# Patient Record
Sex: FEMALE | Race: WHITE | Employment: PART TIME | URBAN - METROPOLITAN AREA
[De-identification: names, ages, dates, MRNs, and addresses within clinical notes are randomized per-mention and may not be internally consistent; named-entity substitution may affect disease eponyms.]

---

## 2019-02-27 ENCOUNTER — TELEPHONE (OUTPATIENT)
Dept: GASTROENTEROLOGY | Facility: CLINIC | Age: 44
End: 2019-02-27

## 2019-02-27 NOTE — TELEPHONE ENCOUNTER
Please inform her that we can not just give an antibiotic until we know what is going on with her Thanks

## 2019-02-27 NOTE — TELEPHONE ENCOUNTER
Dr Audelia Simmonds - Patient called she was taking an antibiotic and by the end of medication patient was able to eat without complications  Is it possible for patient to be given a different antibiotic  Patient has OV 04/11/19  Please call [employer] [phone]   Any questions please call patient at [phone]

## 2019-02-27 NOTE — TELEPHONE ENCOUNTER
Spoke to pt and advised  Said she had a sinus infection but can eat better now and will keep appt in April

## 2019-04-11 ENCOUNTER — OFFICE VISIT (OUTPATIENT)
Dept: GASTROENTEROLOGY | Facility: CLINIC | Age: 44
End: 2019-04-11
Payer: COMMERCIAL

## 2019-04-11 ENCOUNTER — TELEPHONE (OUTPATIENT)
Dept: GASTROENTEROLOGY | Facility: CLINIC | Age: 44
End: 2019-04-11

## 2019-04-11 VITALS
SYSTOLIC BLOOD PRESSURE: 100 MMHG | HEART RATE: 84 BPM | WEIGHT: 176 LBS | DIASTOLIC BLOOD PRESSURE: 70 MMHG | HEIGHT: 62 IN | BODY MASS INDEX: 32.39 KG/M2

## 2019-04-11 DIAGNOSIS — K22.0 ACHALASIA: Primary | ICD-10-CM

## 2019-04-11 PROCEDURE — 99203 OFFICE O/P NEW LOW 30 MIN: CPT | Performed by: PHYSICIAN ASSISTANT

## 2019-04-11 RX ORDER — PANTOPRAZOLE SODIUM 40 MG/1
TABLET, DELAYED RELEASE ORAL
COMMUNITY
Start: 2018-10-02 | End: 2020-01-03 | Stop reason: ALTCHOICE

## 2019-04-15 ENCOUNTER — TELEPHONE (OUTPATIENT)
Dept: GASTROENTEROLOGY | Facility: CLINIC | Age: 44
End: 2019-04-15

## 2019-04-19 ENCOUNTER — OFFICE VISIT (OUTPATIENT)
Dept: CARDIAC SURGERY | Facility: CLINIC | Age: 44
End: 2019-04-19
Payer: COMMERCIAL

## 2019-04-19 ENCOUNTER — TELEPHONE (OUTPATIENT)
Dept: CARDIAC SURGERY | Facility: CLINIC | Age: 44
End: 2019-04-19

## 2019-04-19 ENCOUNTER — PATIENT MESSAGE (OUTPATIENT)
Dept: CARDIAC SURGERY | Facility: CLINIC | Age: 44
End: 2019-04-19

## 2019-04-19 VITALS
BODY MASS INDEX: 31.76 KG/M2 | HEART RATE: 78 BPM | SYSTOLIC BLOOD PRESSURE: 130 MMHG | HEIGHT: 62 IN | OXYGEN SATURATION: 98 % | TEMPERATURE: 98.6 F | DIASTOLIC BLOOD PRESSURE: 78 MMHG | WEIGHT: 172.6 LBS

## 2019-04-19 DIAGNOSIS — K22.0 ACHALASIA: Primary | ICD-10-CM

## 2019-04-19 PROCEDURE — 99245 OFF/OP CONSLTJ NEW/EST HI 55: CPT | Performed by: THORACIC SURGERY (CARDIOTHORACIC VASCULAR SURGERY)

## 2019-04-26 ENCOUNTER — HOSPITAL ENCOUNTER (OUTPATIENT)
Dept: RADIOLOGY | Facility: HOSPITAL | Age: 44
Discharge: HOME | End: 2019-04-26
Attending: THORACIC SURGERY (CARDIOTHORACIC VASCULAR SURGERY)
Payer: COMMERCIAL

## 2019-04-26 DIAGNOSIS — K22.0 ACHALASIA: Primary | ICD-10-CM

## 2019-04-26 DIAGNOSIS — K22.0 ACHALASIA: ICD-10-CM

## 2019-04-26 PROCEDURE — 74240 X-RAY XM UPR GI TRC 1CNTRST: CPT

## 2019-05-02 ENCOUNTER — HOSPITAL ENCOUNTER (OUTPATIENT)
Dept: CT IMAGING | Facility: HOSPITAL | Age: 44
Discharge: HOME | End: 2019-05-02
Attending: THORACIC SURGERY (CARDIOTHORACIC VASCULAR SURGERY)
Payer: COMMERCIAL

## 2019-05-02 DIAGNOSIS — K22.0 ACHALASIA: ICD-10-CM

## 2019-05-02 PROCEDURE — 71250 CT THORAX DX C-: CPT

## 2019-05-03 ENCOUNTER — HOSPITAL ENCOUNTER (OUTPATIENT)
Dept: GASTROENTEROLOGY | Facility: HOSPITAL | Age: 44
Discharge: HOME | End: 2019-05-03
Attending: THORACIC SURGERY (CARDIOTHORACIC VASCULAR SURGERY)
Payer: COMMERCIAL

## 2019-05-03 VITALS
HEART RATE: 58 BPM | DIASTOLIC BLOOD PRESSURE: 72 MMHG | OXYGEN SATURATION: 100 % | SYSTOLIC BLOOD PRESSURE: 118 MMHG | TEMPERATURE: 98.7 F | RESPIRATION RATE: 20 BRPM

## 2019-05-03 PROCEDURE — 91020 GASTRIC MOTILITY STUDIES: CPT

## 2019-05-03 PROCEDURE — 91010 ESOPHAGUS MOTILITY STUDY: CPT | Performed by: INTERNAL MEDICINE

## 2019-05-09 ENCOUNTER — OFFICE VISIT (OUTPATIENT)
Dept: CARDIAC SURGERY | Facility: CLINIC | Age: 44
End: 2019-05-09
Payer: COMMERCIAL

## 2019-05-09 VITALS
HEART RATE: 67 BPM | BODY MASS INDEX: 31.83 KG/M2 | OXYGEN SATURATION: 96 % | HEIGHT: 62 IN | SYSTOLIC BLOOD PRESSURE: 127 MMHG | TEMPERATURE: 98.7 F | DIASTOLIC BLOOD PRESSURE: 65 MMHG | WEIGHT: 173 LBS

## 2019-05-09 DIAGNOSIS — K22.0 ACHALASIA: Primary | ICD-10-CM

## 2019-05-09 PROCEDURE — 99213 OFFICE O/P EST LOW 20 MIN: CPT | Performed by: THORACIC SURGERY (CARDIOTHORACIC VASCULAR SURGERY)

## 2019-05-09 RX ORDER — CEFAZOLIN SODIUM 2 G/50ML
2000 SOLUTION INTRAVENOUS ONCE
Status: CANCELLED | OUTPATIENT
Start: 2019-05-09 | End: 2019-05-09

## 2019-05-10 ENCOUNTER — APPOINTMENT (OUTPATIENT)
Dept: LAB | Facility: HOSPITAL | Age: 44
End: 2019-05-10
Payer: COMMERCIAL

## 2019-05-10 ENCOUNTER — TRANSCRIBE ORDERS (OUTPATIENT)
Dept: ADMINISTRATIVE | Facility: HOSPITAL | Age: 44
End: 2019-05-10

## 2019-05-10 DIAGNOSIS — K22.0 ACHALASIA: Primary | ICD-10-CM

## 2019-05-10 DIAGNOSIS — K22.0 ACHALASIA: ICD-10-CM

## 2019-05-10 LAB
ANION GAP SERPL CALCULATED.3IONS-SCNC: 6 MMOL/L (ref 4–13)
APTT PPP: 36 SECONDS (ref 26–38)
BUN SERPL-MCNC: 6 MG/DL (ref 5–25)
CALCIUM SERPL-MCNC: 8.8 MG/DL (ref 8.3–10.1)
CHLORIDE SERPL-SCNC: 103 MMOL/L (ref 100–108)
CO2 SERPL-SCNC: 29 MMOL/L (ref 21–32)
CREAT SERPL-MCNC: 0.67 MG/DL (ref 0.6–1.3)
ERYTHROCYTE [DISTWIDTH] IN BLOOD BY AUTOMATED COUNT: 12.1 % (ref 11.6–15.1)
GFR SERPL CREATININE-BSD FRML MDRD: 107 ML/MIN/1.73SQ M
GLUCOSE SERPL-MCNC: 90 MG/DL (ref 65–140)
HCT VFR BLD AUTO: 40.3 % (ref 34.8–46.1)
HGB BLD-MCNC: 13.2 G/DL (ref 11.5–15.4)
INR PPP: 1.16 (ref 0.86–1.17)
MCH RBC QN AUTO: 32 PG (ref 26.8–34.3)
MCHC RBC AUTO-ENTMCNC: 32.8 G/DL (ref 31.4–37.4)
MCV RBC AUTO: 98 FL (ref 82–98)
PLATELET # BLD AUTO: 288 THOUSANDS/UL (ref 149–390)
PMV BLD AUTO: 10.7 FL (ref 8.9–12.7)
POTASSIUM SERPL-SCNC: 3.8 MMOL/L (ref 3.5–5.3)
PROTHROMBIN TIME: 14.7 SECONDS (ref 11.8–14.2)
RBC # BLD AUTO: 4.12 MILLION/UL (ref 3.81–5.12)
SODIUM SERPL-SCNC: 138 MMOL/L (ref 136–145)
WBC # BLD AUTO: 7.37 THOUSAND/UL (ref 4.31–10.16)

## 2019-05-10 PROCEDURE — 85610 PROTHROMBIN TIME: CPT

## 2019-05-10 PROCEDURE — 85730 THROMBOPLASTIN TIME PARTIAL: CPT

## 2019-05-10 PROCEDURE — 85027 COMPLETE CBC AUTOMATED: CPT

## 2019-05-10 PROCEDURE — 80048 BASIC METABOLIC PNL TOTAL CA: CPT

## 2019-05-10 PROCEDURE — 36415 COLL VENOUS BLD VENIPUNCTURE: CPT

## 2019-05-10 PROCEDURE — 86900 BLOOD TYPING SEROLOGIC ABO: CPT | Performed by: PHYSICIAN ASSISTANT

## 2019-05-10 PROCEDURE — 86850 RBC ANTIBODY SCREEN: CPT | Performed by: PHYSICIAN ASSISTANT

## 2019-05-10 PROCEDURE — 86901 BLOOD TYPING SEROLOGIC RH(D): CPT | Performed by: PHYSICIAN ASSISTANT

## 2019-05-11 ENCOUNTER — LAB REQUISITION (OUTPATIENT)
Dept: LAB | Facility: HOSPITAL | Age: 44
End: 2019-05-11
Payer: COMMERCIAL

## 2019-05-11 DIAGNOSIS — K22.0 ACHALASIA OF CARDIA: ICD-10-CM

## 2019-05-11 LAB
ABO GROUP BLD: NORMAL
BLD GP AB SCN SERPL QL: NEGATIVE
RH BLD: POSITIVE
SPECIMEN EXPIRATION DATE: NORMAL

## 2019-05-21 ENCOUNTER — ANESTHESIA EVENT (OUTPATIENT)
Dept: PERIOP | Facility: HOSPITAL | Age: 44
DRG: 220 | End: 2019-05-21
Payer: COMMERCIAL

## 2019-05-22 ENCOUNTER — HOSPITAL ENCOUNTER (INPATIENT)
Facility: HOSPITAL | Age: 44
LOS: 2 days | Discharge: HOME | DRG: 220 | End: 2019-05-24
Attending: THORACIC SURGERY (CARDIOTHORACIC VASCULAR SURGERY) | Admitting: THORACIC SURGERY (CARDIOTHORACIC VASCULAR SURGERY)
Payer: COMMERCIAL

## 2019-05-22 ENCOUNTER — ANESTHESIA (OUTPATIENT)
Dept: PERIOP | Facility: HOSPITAL | Age: 44
DRG: 220 | End: 2019-05-22
Payer: COMMERCIAL

## 2019-05-22 ENCOUNTER — APPOINTMENT (INPATIENT)
Dept: RADIOLOGY | Facility: HOSPITAL | Age: 44
DRG: 220 | End: 2019-05-22
Payer: COMMERCIAL

## 2019-05-22 DIAGNOSIS — K22.0 ACHALASIA: Primary | ICD-10-CM

## 2019-05-22 LAB
ANION GAP SERPL CALCULATED.3IONS-SCNC: 12 MMOL/L (ref 4–13)
BUN SERPL-MCNC: 12 MG/DL (ref 5–25)
CALCIUM SERPL-MCNC: 7.6 MG/DL (ref 8.3–10.1)
CHLORIDE SERPL-SCNC: 110 MMOL/L (ref 100–108)
CO2 SERPL-SCNC: 17 MMOL/L (ref 21–32)
CREAT SERPL-MCNC: 0.74 MG/DL (ref 0.6–1.3)
ERYTHROCYTE [DISTWIDTH] IN BLOOD BY AUTOMATED COUNT: 12.1 % (ref 11.6–15.1)
GFR SERPL CREATININE-BSD FRML MDRD: 99 ML/MIN/1.73SQ M
GLUCOSE SERPL-MCNC: 92 MG/DL (ref 65–140)
HCT VFR BLD AUTO: 38 % (ref 34.8–46.1)
HGB BLD-MCNC: 12 G/DL (ref 11.5–15.4)
MAGNESIUM SERPL-MCNC: 1.9 MG/DL (ref 1.6–2.6)
MCH RBC QN AUTO: 31.7 PG (ref 26.8–34.3)
MCHC RBC AUTO-ENTMCNC: 31.6 G/DL (ref 31.4–37.4)
MCV RBC AUTO: 101 FL (ref 82–98)
PLATELET # BLD AUTO: 200 THOUSANDS/UL (ref 149–390)
PLATELET # BLD AUTO: 204 THOUSANDS/UL (ref 149–390)
PMV BLD AUTO: 10.9 FL (ref 8.9–12.7)
PMV BLD AUTO: 11.5 FL (ref 8.9–12.7)
POTASSIUM SERPL-SCNC: 3.9 MMOL/L (ref 3.5–5.3)
RBC # BLD AUTO: 3.78 MILLION/UL (ref 3.81–5.12)
SODIUM SERPL-SCNC: 139 MMOL/L (ref 136–145)
WBC # BLD AUTO: 13.16 THOUSAND/UL (ref 4.31–10.16)

## 2019-05-22 PROCEDURE — 80048 BASIC METABOLIC PNL TOTAL CA: CPT | Performed by: PHYSICIAN ASSISTANT

## 2019-05-22 PROCEDURE — 83735 ASSAY OF MAGNESIUM: CPT | Performed by: PHYSICIAN ASSISTANT

## 2019-05-22 PROCEDURE — 71045 X-RAY EXAM CHEST 1 VIEW: CPT

## 2019-05-22 PROCEDURE — 0D844ZZ DIVISION OF ESOPHAGOGASTRIC JUNCTION, PERCUTANEOUS ENDOSCOPIC APPROACH: ICD-10-PCS | Performed by: THORACIC SURGERY (CARDIOTHORACIC VASCULAR SURGERY)

## 2019-05-22 PROCEDURE — 0DV44ZZ RESTRICTION OF ESOPHAGOGASTRIC JUNCTION, PERCUTANEOUS ENDOSCOPIC APPROACH: ICD-10-PCS | Performed by: THORACIC SURGERY (CARDIOTHORACIC VASCULAR SURGERY)

## 2019-05-22 PROCEDURE — 85027 COMPLETE CBC AUTOMATED: CPT | Performed by: PHYSICIAN ASSISTANT

## 2019-05-22 PROCEDURE — 43279 LAP MYOTOMY HELLER: CPT | Performed by: THORACIC SURGERY (CARDIOTHORACIC VASCULAR SURGERY)

## 2019-05-22 PROCEDURE — 85049 AUTOMATED PLATELET COUNT: CPT | Performed by: PHYSICIAN ASSISTANT

## 2019-05-22 RX ORDER — GLYCOPYRROLATE 0.2 MG/ML
INJECTION INTRAMUSCULAR; INTRAVENOUS AS NEEDED
Status: DISCONTINUED | OUTPATIENT
Start: 2019-05-22 | End: 2019-05-22 | Stop reason: SURG

## 2019-05-22 RX ORDER — KETAMINE HYDROCHLORIDE 50 MG/ML
INJECTION, SOLUTION, CONCENTRATE INTRAMUSCULAR; INTRAVENOUS AS NEEDED
Status: DISCONTINUED | OUTPATIENT
Start: 2019-05-22 | End: 2019-05-22 | Stop reason: SURG

## 2019-05-22 RX ORDER — ACETAMINOPHEN 325 MG/1
650 TABLET ORAL EVERY 6 HOURS PRN
COMMUNITY
End: 2019-05-24 | Stop reason: HOSPADM

## 2019-05-22 RX ORDER — ONDANSETRON 2 MG/ML
4 INJECTION INTRAMUSCULAR; INTRAVENOUS ONCE AS NEEDED
Status: DISCONTINUED | OUTPATIENT
Start: 2019-05-22 | End: 2019-05-22 | Stop reason: HOSPADM

## 2019-05-22 RX ORDER — ROCURONIUM BROMIDE 10 MG/ML
INJECTION, SOLUTION INTRAVENOUS AS NEEDED
Status: DISCONTINUED | OUTPATIENT
Start: 2019-05-22 | End: 2019-05-22 | Stop reason: SURG

## 2019-05-22 RX ORDER — HYDROMORPHONE HCL/PF 1 MG/ML
0.5 SYRINGE (ML) INJECTION
Status: DISCONTINUED | OUTPATIENT
Start: 2019-05-22 | End: 2019-05-22

## 2019-05-22 RX ORDER — LIDOCAINE HYDROCHLORIDE AND EPINEPHRINE 10; 10 MG/ML; UG/ML
INJECTION, SOLUTION INFILTRATION; PERINEURAL AS NEEDED
Status: DISCONTINUED | OUTPATIENT
Start: 2019-05-22 | End: 2019-05-22 | Stop reason: HOSPADM

## 2019-05-22 RX ORDER — ALBUMIN, HUMAN INJ 5% 5 %
SOLUTION INTRAVENOUS CONTINUOUS PRN
Status: DISCONTINUED | OUTPATIENT
Start: 2019-05-22 | End: 2019-05-22 | Stop reason: SURG

## 2019-05-22 RX ORDER — FENTANYL CITRATE/PF 50 MCG/ML
25 SYRINGE (ML) INJECTION
Status: COMPLETED | OUTPATIENT
Start: 2019-05-22 | End: 2019-05-22

## 2019-05-22 RX ORDER — MAGNESIUM HYDROXIDE 1200 MG/15ML
LIQUID ORAL AS NEEDED
Status: DISCONTINUED | OUTPATIENT
Start: 2019-05-22 | End: 2019-05-22 | Stop reason: HOSPADM

## 2019-05-22 RX ORDER — HYDROMORPHONE HCL/PF 1 MG/ML
0.5 SYRINGE (ML) INJECTION
Status: DISCONTINUED | OUTPATIENT
Start: 2019-05-22 | End: 2019-05-24 | Stop reason: HOSPADM

## 2019-05-22 RX ORDER — MIDAZOLAM HYDROCHLORIDE 1 MG/ML
INJECTION INTRAMUSCULAR; INTRAVENOUS AS NEEDED
Status: DISCONTINUED | OUTPATIENT
Start: 2019-05-22 | End: 2019-05-22 | Stop reason: SURG

## 2019-05-22 RX ORDER — OXYCODONE HYDROCHLORIDE 10 MG/1
10 TABLET ORAL EVERY 4 HOURS PRN
Status: DISCONTINUED | OUTPATIENT
Start: 2019-05-22 | End: 2019-05-22

## 2019-05-22 RX ORDER — DEXAMETHASONE SODIUM PHOSPHATE 10 MG/ML
INJECTION, SOLUTION INTRAMUSCULAR; INTRAVENOUS AS NEEDED
Status: DISCONTINUED | OUTPATIENT
Start: 2019-05-22 | End: 2019-05-22 | Stop reason: SURG

## 2019-05-22 RX ORDER — HYDROMORPHONE HCL/PF 1 MG/ML
0.5 SYRINGE (ML) INJECTION
Status: DISCONTINUED | OUTPATIENT
Start: 2019-05-22 | End: 2019-05-22 | Stop reason: HOSPADM

## 2019-05-22 RX ORDER — HYDROMORPHONE HYDROCHLORIDE 2 MG/ML
INJECTION, SOLUTION INTRAMUSCULAR; INTRAVENOUS; SUBCUTANEOUS AS NEEDED
Status: DISCONTINUED | OUTPATIENT
Start: 2019-05-22 | End: 2019-05-22 | Stop reason: SURG

## 2019-05-22 RX ORDER — OXYCODONE HYDROCHLORIDE 5 MG/1
5 TABLET ORAL EVERY 4 HOURS PRN
Status: DISCONTINUED | OUTPATIENT
Start: 2019-05-22 | End: 2019-05-22

## 2019-05-22 RX ORDER — SODIUM CHLORIDE, SODIUM LACTATE, POTASSIUM CHLORIDE, CALCIUM CHLORIDE 600; 310; 30; 20 MG/100ML; MG/100ML; MG/100ML; MG/100ML
125 INJECTION, SOLUTION INTRAVENOUS CONTINUOUS
Status: DISCONTINUED | OUTPATIENT
Start: 2019-05-22 | End: 2019-05-22

## 2019-05-22 RX ORDER — FENTANYL CITRATE 50 UG/ML
INJECTION, SOLUTION INTRAMUSCULAR; INTRAVENOUS AS NEEDED
Status: DISCONTINUED | OUTPATIENT
Start: 2019-05-22 | End: 2019-05-22 | Stop reason: SURG

## 2019-05-22 RX ORDER — LIDOCAINE HYDROCHLORIDE 10 MG/ML
INJECTION, SOLUTION INFILTRATION; PERINEURAL AS NEEDED
Status: DISCONTINUED | OUTPATIENT
Start: 2019-05-22 | End: 2019-05-22 | Stop reason: SURG

## 2019-05-22 RX ORDER — SUCCINYLCHOLINE/SOD CL,ISO/PF 100 MG/5ML
SYRINGE (ML) INTRAVENOUS AS NEEDED
Status: DISCONTINUED | OUTPATIENT
Start: 2019-05-22 | End: 2019-05-22 | Stop reason: SURG

## 2019-05-22 RX ORDER — SODIUM CHLORIDE, SODIUM LACTATE, POTASSIUM CHLORIDE, CALCIUM CHLORIDE 600; 310; 30; 20 MG/100ML; MG/100ML; MG/100ML; MG/100ML
60 INJECTION, SOLUTION INTRAVENOUS CONTINUOUS
Status: DISCONTINUED | OUTPATIENT
Start: 2019-05-22 | End: 2019-05-23

## 2019-05-22 RX ORDER — LIDOCAINE HYDROCHLORIDE 10 MG/ML
0.5 INJECTION, SOLUTION EPIDURAL; INFILTRATION; INTRACAUDAL; PERINEURAL ONCE AS NEEDED
Status: COMPLETED | OUTPATIENT
Start: 2019-05-22 | End: 2019-05-22

## 2019-05-22 RX ORDER — ONDANSETRON 2 MG/ML
4 INJECTION INTRAMUSCULAR; INTRAVENOUS EVERY 4 HOURS PRN
Status: DISCONTINUED | OUTPATIENT
Start: 2019-05-22 | End: 2019-05-24 | Stop reason: HOSPADM

## 2019-05-22 RX ORDER — SODIUM CHLORIDE, SODIUM LACTATE, POTASSIUM CHLORIDE, CALCIUM CHLORIDE 600; 310; 30; 20 MG/100ML; MG/100ML; MG/100ML; MG/100ML
100 INJECTION, SOLUTION INTRAVENOUS CONTINUOUS
Status: DISCONTINUED | OUTPATIENT
Start: 2019-05-22 | End: 2019-05-23

## 2019-05-22 RX ORDER — ESMOLOL HYDROCHLORIDE 10 MG/ML
INJECTION INTRAVENOUS AS NEEDED
Status: DISCONTINUED | OUTPATIENT
Start: 2019-05-22 | End: 2019-05-22 | Stop reason: SURG

## 2019-05-22 RX ORDER — ONDANSETRON 2 MG/ML
4 INJECTION INTRAMUSCULAR; INTRAVENOUS EVERY 6 HOURS PRN
Status: DISCONTINUED | OUTPATIENT
Start: 2019-05-22 | End: 2019-05-22

## 2019-05-22 RX ORDER — ONDANSETRON 2 MG/ML
INJECTION INTRAMUSCULAR; INTRAVENOUS AS NEEDED
Status: DISCONTINUED | OUTPATIENT
Start: 2019-05-22 | End: 2019-05-22 | Stop reason: SURG

## 2019-05-22 RX ORDER — NEOSTIGMINE METHYLSULFATE 1 MG/ML
INJECTION INTRAVENOUS AS NEEDED
Status: DISCONTINUED | OUTPATIENT
Start: 2019-05-22 | End: 2019-05-22 | Stop reason: SURG

## 2019-05-22 RX ORDER — CEFAZOLIN SODIUM 2 G/50ML
2000 SOLUTION INTRAVENOUS ONCE
Status: COMPLETED | OUTPATIENT
Start: 2019-05-22 | End: 2019-05-22

## 2019-05-22 RX ORDER — SODIUM CHLORIDE 9 MG/ML
INJECTION, SOLUTION INTRAVENOUS CONTINUOUS PRN
Status: DISCONTINUED | OUTPATIENT
Start: 2019-05-22 | End: 2019-05-22 | Stop reason: SURG

## 2019-05-22 RX ORDER — ACETAMINOPHEN 325 MG/1
975 TABLET ORAL EVERY 6 HOURS PRN
Status: DISCONTINUED | OUTPATIENT
Start: 2019-05-22 | End: 2019-05-22

## 2019-05-22 RX ORDER — HYDROMORPHONE HCL/PF 1 MG/ML
0.2 SYRINGE (ML) INJECTION
Status: DISCONTINUED | OUTPATIENT
Start: 2019-05-22 | End: 2019-05-23

## 2019-05-22 RX ORDER — HYDROMORPHONE HCL/PF 1 MG/ML
1 SYRINGE (ML) INJECTION
Status: DISCONTINUED | OUTPATIENT
Start: 2019-05-22 | End: 2019-05-24 | Stop reason: HOSPADM

## 2019-05-22 RX ADMIN — GLYCOPYRROLATE 0.1 MG: 0.2 INJECTION, SOLUTION INTRAMUSCULAR; INTRAVENOUS at 13:10

## 2019-05-22 RX ADMIN — FENTANYL CITRATE 50 MCG: 50 INJECTION, SOLUTION INTRAMUSCULAR; INTRAVENOUS at 13:51

## 2019-05-22 RX ADMIN — SODIUM CHLORIDE, SODIUM LACTATE, POTASSIUM CHLORIDE, AND CALCIUM CHLORIDE: .6; .31; .03; .02 INJECTION, SOLUTION INTRAVENOUS at 13:04

## 2019-05-22 RX ADMIN — FENTANYL CITRATE 50 MCG: 50 INJECTION, SOLUTION INTRAMUSCULAR; INTRAVENOUS at 14:17

## 2019-05-22 RX ADMIN — KETAMINE HYDROCHLORIDE 50 MG: 50 INJECTION, SOLUTION INTRAMUSCULAR; INTRAVENOUS at 13:10

## 2019-05-22 RX ADMIN — ALBUMIN (HUMAN): 12.5 SOLUTION INTRAVENOUS at 14:18

## 2019-05-22 RX ADMIN — GLYCOPYRROLATE 0.1 MG: 0.2 INJECTION, SOLUTION INTRAMUSCULAR; INTRAVENOUS at 13:57

## 2019-05-22 RX ADMIN — NEOSTIGMINE METHYLSULFATE 3 MG: 1 INJECTION, SOLUTION INTRAVENOUS at 16:48

## 2019-05-22 RX ADMIN — ONDANSETRON 4 MG: 2 INJECTION INTRAMUSCULAR; INTRAVENOUS at 16:46

## 2019-05-22 RX ADMIN — FENTANYL CITRATE 50 MCG: 50 INJECTION, SOLUTION INTRAMUSCULAR; INTRAVENOUS at 13:10

## 2019-05-22 RX ADMIN — CEFAZOLIN SODIUM 2000 MG: 2 SOLUTION INTRAVENOUS at 13:26

## 2019-05-22 RX ADMIN — FENTANYL CITRATE 25 MCG: 50 INJECTION, SOLUTION INTRAMUSCULAR; INTRAVENOUS at 17:25

## 2019-05-22 RX ADMIN — FENTANYL CITRATE 25 MCG: 50 INJECTION, SOLUTION INTRAMUSCULAR; INTRAVENOUS at 17:45

## 2019-05-22 RX ADMIN — HYDROMORPHONE HYDROCHLORIDE 0.5 MG: 1 INJECTION, SOLUTION INTRAMUSCULAR; INTRAVENOUS; SUBCUTANEOUS at 21:51

## 2019-05-22 RX ADMIN — MIDAZOLAM 2 MG: 1 INJECTION INTRAMUSCULAR; INTRAVENOUS at 13:02

## 2019-05-22 RX ADMIN — ROCURONIUM BROMIDE 10 MG: 10 INJECTION, SOLUTION INTRAVENOUS at 13:51

## 2019-05-22 RX ADMIN — SODIUM CHLORIDE, SODIUM LACTATE, POTASSIUM CHLORIDE, AND CALCIUM CHLORIDE 125 ML/HR: .6; .31; .03; .02 INJECTION, SOLUTION INTRAVENOUS at 11:55

## 2019-05-22 RX ADMIN — ESMOLOL HYDROCHLORIDE 10 MG: 100 INJECTION, SOLUTION INTRAVENOUS at 16:16

## 2019-05-22 RX ADMIN — SODIUM CHLORIDE, SODIUM LACTATE, POTASSIUM CHLORIDE, AND CALCIUM CHLORIDE 60 ML/HR: .6; .31; .03; .02 INJECTION, SOLUTION INTRAVENOUS at 19:30

## 2019-05-22 RX ADMIN — ALBUMIN (HUMAN): 12.5 SOLUTION INTRAVENOUS at 13:16

## 2019-05-22 RX ADMIN — ALBUMIN (HUMAN): 12.5 SOLUTION INTRAVENOUS at 16:25

## 2019-05-22 RX ADMIN — FENTANYL CITRATE 25 MCG: 50 INJECTION, SOLUTION INTRAMUSCULAR; INTRAVENOUS at 17:35

## 2019-05-22 RX ADMIN — METRONIDAZOLE 500 MG: 500 INJECTION, SOLUTION INTRAVENOUS at 13:20

## 2019-05-22 RX ADMIN — ROCURONIUM BROMIDE 40 MG: 10 INJECTION, SOLUTION INTRAVENOUS at 13:24

## 2019-05-22 RX ADMIN — SODIUM CHLORIDE: 0.9 INJECTION, SOLUTION INTRAVENOUS at 13:15

## 2019-05-22 RX ADMIN — GLYCOPYRROLATE 0.6 MG: 0.2 INJECTION, SOLUTION INTRAMUSCULAR; INTRAVENOUS at 16:48

## 2019-05-22 RX ADMIN — PHENYLEPHRINE HYDROCHLORIDE 20 MCG/MIN: 10 INJECTION INTRAVENOUS at 13:17

## 2019-05-22 RX ADMIN — HYDROMORPHONE HYDROCHLORIDE 0.5 MG: 2 INJECTION, SOLUTION INTRAMUSCULAR; INTRAVENOUS; SUBCUTANEOUS at 13:29

## 2019-05-22 RX ADMIN — FENTANYL CITRATE 50 MCG: 50 INJECTION, SOLUTION INTRAMUSCULAR; INTRAVENOUS at 16:01

## 2019-05-22 RX ADMIN — LIDOCAINE HYDROCHLORIDE 0.5 ML: 10 INJECTION, SOLUTION EPIDURAL; INFILTRATION; INTRACAUDAL; PERINEURAL at 11:55

## 2019-05-22 RX ADMIN — FENTANYL CITRATE 25 MCG: 50 INJECTION, SOLUTION INTRAMUSCULAR; INTRAVENOUS at 17:55

## 2019-05-22 RX ADMIN — LIDOCAINE HYDROCHLORIDE 100 MG: 10 INJECTION, SOLUTION INFILTRATION; PERINEURAL at 13:10

## 2019-05-22 RX ADMIN — Medication 100 MG: at 13:10

## 2019-05-22 RX ADMIN — DEXAMETHASONE SODIUM PHOSPHATE 10 MG: 10 INJECTION, SOLUTION INTRAMUSCULAR; INTRAVENOUS at 13:10

## 2019-05-22 NOTE — ANESTHESIA POSTPROCEDURE EVALUATION
Post-Op Assessment Note    CV Status:  Stable  Pain Score: 0    Pain management: adequate     Mental Status:  Alert and awake   Hydration Status:  Euvolemic   PONV Controlled:  Controlled   Airway Patency:  Patent   Post Op Vitals Reviewed: Yes      Staff: CRNA   Comments: patient TXd to PACU O2 via NC 2L, SV, no events, report give to PACU RN @ bedside, VSS no bfurther interventions @THIS MTIME          BP   115/72   Temp   98 5   Pulse  106   Resp   18   SpO2   100 2L NC

## 2019-05-22 NOTE — ANESTHESIA PREPROCEDURE EVALUATION
Review of Systems/Medical History  Patient summary reviewed  Chart reviewed      Cardiovascular  Negative cardio ROS Exercise tolerance (METS): >4,     Pulmonary  Smoker ex-smoker  ,        GI/Hepatic    GERD well controlled,        Negative  ROS        Endo/Other  Negative endo/other ROS      GYN  Negative gynecology ROS          Hematology  Negative hematology ROS      Musculoskeletal  Negative musculoskeletal ROS        Neurology  Negative neurology ROS      Psychology   Negative psychology ROS              Physical Exam    Airway    Mallampati score: II  TM Distance: >3 FB  Neck ROM: full     Dental   upper dentures and lower dentures,     Cardiovascular  Comment: Negative ROS, Cardiovascular exam normal    Pulmonary  Pulmonary exam normal     Other Findings        Anesthesia Plan  ASA Score- 2     Anesthesia Type- general with ASA Monitors  Additional Monitors:   Airway Plan: ETT  Plan Factors-  Patient did not smoke on day of surgery  Induction- intravenous and rapid sequence induction  Postoperative Plan-     Informed Consent- Anesthetic plan and risks discussed with patient  I personally reviewed this patient with the CRNA  Discussed and agreed on the Anesthesia Plan with the CRNA  Ivonne De Santiago

## 2019-05-23 ENCOUNTER — APPOINTMENT (INPATIENT)
Dept: RADIOLOGY | Facility: HOSPITAL | Age: 44
DRG: 220 | End: 2019-05-23
Payer: COMMERCIAL

## 2019-05-23 LAB
ANION GAP SERPL CALCULATED.3IONS-SCNC: 13 MMOL/L (ref 4–13)
BUN SERPL-MCNC: 9 MG/DL (ref 5–25)
CALCIUM SERPL-MCNC: 8.1 MG/DL (ref 8.3–10.1)
CHLORIDE SERPL-SCNC: 113 MMOL/L (ref 100–108)
CO2 SERPL-SCNC: 16 MMOL/L (ref 21–32)
CREAT SERPL-MCNC: 0.53 MG/DL (ref 0.6–1.3)
ERYTHROCYTE [DISTWIDTH] IN BLOOD BY AUTOMATED COUNT: 12.1 % (ref 11.6–15.1)
GFR SERPL CREATININE-BSD FRML MDRD: 116 ML/MIN/1.73SQ M
GLUCOSE SERPL-MCNC: 71 MG/DL (ref 65–140)
HCT VFR BLD AUTO: 36.8 % (ref 34.8–46.1)
HGB BLD-MCNC: 11.7 G/DL (ref 11.5–15.4)
MAGNESIUM SERPL-MCNC: 2 MG/DL (ref 1.6–2.6)
MCH RBC QN AUTO: 31.5 PG (ref 26.8–34.3)
MCHC RBC AUTO-ENTMCNC: 31.8 G/DL (ref 31.4–37.4)
MCV RBC AUTO: 99 FL (ref 82–98)
PLATELET # BLD AUTO: 208 THOUSANDS/UL (ref 149–390)
PMV BLD AUTO: 11.6 FL (ref 8.9–12.7)
POTASSIUM SERPL-SCNC: 4.3 MMOL/L (ref 3.5–5.3)
RBC # BLD AUTO: 3.72 MILLION/UL (ref 3.81–5.12)
SODIUM SERPL-SCNC: 142 MMOL/L (ref 136–145)
WBC # BLD AUTO: 8.49 THOUSAND/UL (ref 4.31–10.16)

## 2019-05-23 PROCEDURE — 85027 COMPLETE CBC AUTOMATED: CPT | Performed by: PHYSICIAN ASSISTANT

## 2019-05-23 PROCEDURE — 74220 X-RAY XM ESOPHAGUS 1CNTRST: CPT

## 2019-05-23 PROCEDURE — 99024 POSTOP FOLLOW-UP VISIT: CPT | Performed by: THORACIC SURGERY (CARDIOTHORACIC VASCULAR SURGERY)

## 2019-05-23 PROCEDURE — 80048 BASIC METABOLIC PNL TOTAL CA: CPT | Performed by: PHYSICIAN ASSISTANT

## 2019-05-23 PROCEDURE — NC001 PR NO CHARGE: Performed by: PHYSICIAN ASSISTANT

## 2019-05-23 PROCEDURE — 83735 ASSAY OF MAGNESIUM: CPT | Performed by: PHYSICIAN ASSISTANT

## 2019-05-23 RX ORDER — ACETAMINOPHEN 160 MG/5ML
650 SUSPENSION, ORAL (FINAL DOSE FORM) ORAL EVERY 6 HOURS SCHEDULED
Status: DISCONTINUED | OUTPATIENT
Start: 2019-05-23 | End: 2019-05-24 | Stop reason: HOSPADM

## 2019-05-23 RX ORDER — POLYETHYLENE GLYCOL 3350 17 G/17G
17 POWDER, FOR SOLUTION ORAL DAILY
Status: DISCONTINUED | OUTPATIENT
Start: 2019-05-23 | End: 2019-05-24 | Stop reason: HOSPADM

## 2019-05-23 RX ORDER — PANTOPRAZOLE SODIUM 40 MG/1
40 TABLET, DELAYED RELEASE ORAL
Status: DISCONTINUED | OUTPATIENT
Start: 2019-05-23 | End: 2019-05-23

## 2019-05-23 RX ORDER — DOCUSATE SODIUM 100 MG/1
100 CAPSULE, LIQUID FILLED ORAL 2 TIMES DAILY
Status: DISCONTINUED | OUTPATIENT
Start: 2019-05-23 | End: 2019-05-23

## 2019-05-23 RX ORDER — OXYCODONE HCL 5 MG/5 ML
5 SOLUTION, ORAL ORAL EVERY 4 HOURS PRN
Status: DISCONTINUED | OUTPATIENT
Start: 2019-05-23 | End: 2019-05-24 | Stop reason: HOSPADM

## 2019-05-23 RX ADMIN — ENOXAPARIN SODIUM 40 MG: 40 INJECTION SUBCUTANEOUS at 08:41

## 2019-05-23 RX ADMIN — OXYCODONE HYDROCHLORIDE 5 MG: 5 SOLUTION ORAL at 20:58

## 2019-05-23 RX ADMIN — HYDROMORPHONE HYDROCHLORIDE 0.2 MG: 1 INJECTION, SOLUTION INTRAMUSCULAR; INTRAVENOUS; SUBCUTANEOUS at 05:27

## 2019-05-23 RX ADMIN — OXYCODONE HYDROCHLORIDE 5 MG: 5 SOLUTION ORAL at 11:56

## 2019-05-23 RX ADMIN — POLYETHYLENE GLYCOL 3350 17 G: 17 POWDER, FOR SOLUTION ORAL at 11:15

## 2019-05-23 RX ADMIN — ACETAMINOPHEN 650 MG: 160 SUSPENSION ORAL at 17:20

## 2019-05-23 RX ADMIN — ACETAMINOPHEN 650 MG: 160 SUSPENSION ORAL at 11:17

## 2019-05-23 RX ADMIN — IOHEXOL 50 ML: 350 INJECTION, SOLUTION INTRAVENOUS at 08:10

## 2019-05-23 RX ADMIN — ONDANSETRON 4 MG: 2 INJECTION INTRAMUSCULAR; INTRAVENOUS at 12:10

## 2019-05-23 RX ADMIN — ACETAMINOPHEN 650 MG: 160 SUSPENSION ORAL at 23:10

## 2019-05-24 VITALS
HEART RATE: 63 BPM | TEMPERATURE: 97.6 F | RESPIRATION RATE: 18 BRPM | DIASTOLIC BLOOD PRESSURE: 68 MMHG | BODY MASS INDEX: 31.65 KG/M2 | WEIGHT: 172 LBS | HEIGHT: 62 IN | OXYGEN SATURATION: 98 % | SYSTOLIC BLOOD PRESSURE: 111 MMHG

## 2019-05-24 PROCEDURE — 99024 POSTOP FOLLOW-UP VISIT: CPT | Performed by: THORACIC SURGERY (CARDIOTHORACIC VASCULAR SURGERY)

## 2019-05-24 RX ORDER — ACETAMINOPHEN 160 MG/5ML
650 SUSPENSION, ORAL (FINAL DOSE FORM) ORAL EVERY 6 HOURS PRN
Qty: 355 ML | Refills: 0 | Status: SHIPPED | OUTPATIENT
Start: 2019-05-24 | End: 2019-05-31

## 2019-05-24 RX ORDER — OXYCODONE HCL 5 MG/5 ML
5 SOLUTION, ORAL ORAL EVERY 4 HOURS PRN
Qty: 473 ML | Refills: 0 | Status: SHIPPED | OUTPATIENT
Start: 2019-05-24 | End: 2019-06-09

## 2019-05-24 RX ADMIN — ACETAMINOPHEN 650 MG: 160 SUSPENSION ORAL at 12:16

## 2019-05-24 RX ADMIN — ENOXAPARIN SODIUM 40 MG: 40 INJECTION SUBCUTANEOUS at 08:32

## 2019-05-24 RX ADMIN — ACETAMINOPHEN 650 MG: 160 SUSPENSION ORAL at 05:09

## 2019-06-03 ENCOUNTER — TELEPHONE (OUTPATIENT)
Dept: CARDIAC SURGERY | Facility: CLINIC | Age: 44
End: 2019-06-03

## 2019-06-13 ENCOUNTER — OFFICE VISIT (OUTPATIENT)
Dept: CARDIAC SURGERY | Facility: CLINIC | Age: 44
End: 2019-06-13

## 2019-06-13 VITALS
TEMPERATURE: 98.1 F | SYSTOLIC BLOOD PRESSURE: 112 MMHG | DIASTOLIC BLOOD PRESSURE: 69 MMHG | HEART RATE: 66 BPM | WEIGHT: 162.2 LBS | HEIGHT: 62 IN | OXYGEN SATURATION: 99 % | BODY MASS INDEX: 29.85 KG/M2

## 2019-06-13 DIAGNOSIS — K22.0 ACHALASIA: Primary | ICD-10-CM

## 2019-06-13 PROCEDURE — 99024 POSTOP FOLLOW-UP VISIT: CPT | Performed by: THORACIC SURGERY (CARDIOTHORACIC VASCULAR SURGERY)

## 2019-07-19 ENCOUNTER — OFFICE VISIT (OUTPATIENT)
Dept: CARDIAC SURGERY | Facility: CLINIC | Age: 44
End: 2019-07-19

## 2019-07-19 VITALS
HEART RATE: 86 BPM | BODY MASS INDEX: 28.89 KG/M2 | HEIGHT: 62 IN | TEMPERATURE: 98.1 F | SYSTOLIC BLOOD PRESSURE: 126 MMHG | DIASTOLIC BLOOD PRESSURE: 80 MMHG | OXYGEN SATURATION: 99 % | WEIGHT: 157 LBS

## 2019-07-19 DIAGNOSIS — K22.0 ACHALASIA: Primary | ICD-10-CM

## 2019-07-19 PROCEDURE — 99024 POSTOP FOLLOW-UP VISIT: CPT | Performed by: THORACIC SURGERY (CARDIOTHORACIC VASCULAR SURGERY)

## 2019-07-19 NOTE — LETTER
July 19, 2019     Elsa Aguirre DO  [address] [address]    Patient: Kwame Cruz   YOB: 1975   Date of Visit: 7/19/2019       Dear Dr Lissett Harrison: Thank you for referring Kwame Cruz to me for evaluation  Below are my notes for this consultation  If you have questions, please do not hesitate to call me  I look forward to following your patient along with you  Sincerely,        Natalie Echols MD        CC: JANIS Baugh MD  7/19/2019 10:38 AM  Incomplete  Thoracic Follow-Up  Assessment/Plan:    Achalasia  Ms Albert Dean  Is a 55-year-old female who is well known to me  She is status post laparoscopic Heller myotomy on 05/22/2019  She presents for her 2nd postoperative visit  I am overall very happy with her progress since surgery  She is doing well and tolerating a diet without any restrictions  Today in the office, I lifted all of her postoperative restrictions  She can swim and she has no lifting restrictions at this time  She will follow up with me in 6 months without any studies just to ensure continued success of the surgery  She knows to call my office if there are any changes in her status before that time  Sravani Au MD  Thoracic Surgery  (Available on [employer])  Office: [phone]         Diagnoses and all orders for this visit:    Achalasia          Thoracic History          Subjective:    Patient ID: Kwame Cruz is a 40 y o  female  HPI  Ms Albert Dean  Is a 55-year-old female who is well known to me  She is status post laparoscopic Heller myotomy on 05/22/2019  She presents for her 2nd postoperative visit  Today in the office, she states that she is doing well that her swallowing is grade she denies any dysphagia  She reports that sometimes she over eats and she will feel nauseous at the end of a big meal   Otherwise though, she says that she feels good    Her activities are back to normal and she has resumed working without a problem  She states that her energy is great and she has more energy now than she previously did before the surgery  The following portions of the patient's history were reviewed and updated as appropriate: allergies, current medications, past family history, past medical history, past social history, past surgical history and problem list     Review of Systems   Constitutional: Negative for chills, fatigue, fever and unexpected weight change  HENT: Negative  Eyes: Negative  Negative for visual disturbance  Respiratory: Negative for cough, shortness of breath and stridor  Cardiovascular: Negative for chest pain  Gastrointestinal: Negative  Negative for abdominal pain, constipation, diarrhea, nausea and vomiting  Endocrine: Negative  Genitourinary: Negative  Musculoskeletal: Negative  Skin: Negative  Neurological: Negative for dizziness, light-headedness and headaches  Hematological: Negative for adenopathy  Psychiatric/Behavioral: Negative  Objective:   Physical Exam   Constitutional: She is oriented to person, place, and time  She appears well-developed and well-nourished  HENT:   Head: Normocephalic and atraumatic  Eyes: Pupils are equal, round, and reactive to light  Neck: Normal range of motion  No tracheal deviation present  Cardiovascular: Normal rate, regular rhythm and normal heart sounds  No murmur heard  Pulmonary/Chest: Effort normal and breath sounds normal  No stridor  No respiratory distress  She has no wheezes  She has no rales  She exhibits no tenderness  Abdominal: Soft  Bowel sounds are normal  She exhibits no distension  There is no tenderness  There is no rebound  Incisions well healed   Musculoskeletal: Normal range of motion  She exhibits no edema  Lymphadenopathy:     She has no cervical adenopathy  Neurological: She is alert and oriented to person, place, and time     Skin: Skin is warm and dry  No rash noted  She is not diaphoretic  No erythema  No pallor  Psychiatric: She has a normal mood and affect  Her behavior is normal  Judgment and thought content normal    Nursing note and vitals reviewed  /80 (BP Location: Left arm, Patient Position: Sitting, Cuff Size: Adult)   Pulse 86   Temp 98 1 °F (36 7 °C) (Oral)   Ht 5' 2" (1 575 m)   Wt 71 2 kg (157 lb)   SpO2 99%   BMI 28 72 kg/m²      No Chest XR results available for this patient  Ct Chest Wo Contrast    Result Date: 5/6/2019  Narrative CT CHEST WITHOUT IV CONTRAST INDICATION:   K22 0: Achalasia of cardia  Further evaluation of esophageal size COMPARISON:  No prior CT studies of the chest   Upper GI, 4/26/2019 TECHNIQUE: CT examination of the chest was performed without intravenous contrast   Axial, sagittal, and coronal 2D reformatted images were created from the source data and submitted for interpretation  Radiation dose length product (DLP) for this visit:  210 48 mGy-cm   This examination, like all CT scans performed in the [facility], was performed utilizing techniques to minimize radiation dose exposure, including the use of iterative  reconstruction and automated exposure control  FINDINGS: LUNGS:  There are no suspicious pulmonary nodules or regions of airspace consolidation  There are scattered ill-defined groundglass opacities within the lungs  For example, the changes are most pronounced within the superior segment of the right lower lobe (for example image 3/52, coronal image 114) PLEURA:  Unremarkable  HEART/GREAT VESSELS:  Unremarkable for patient's age  MEDIASTINUM AND STEPHAN:  There is no adenopathy  There is significant diffuse dilatation of the esophagus, with retention of food material or secretions throughout its length  The dilated esophagus extends from the level of the thoracic inlet, above the level of the clavicles, to the level of the esophagogastric junction    The largest diameter is noted at the level of the inferior pulmonary veins, on image 2/37 the diameter is 7 5 x 5 2 cm  At the level of the reynaldo the diameter is 4 2 x 4 4 cm  At the level of the superior margin of the aortic arch the diameter is 4 2 x 4 4 cm  There is abrupt tapering at the esophagogastric junction, with normal diameter of stomach CHEST WALL AND LOWER NECK:   Unremarkable  VISUALIZED STRUCTURES IN THE UPPER ABDOMEN:  Gallbladder is surgically absent OSSEOUS STRUCTURES:  No acute fracture or destructive osseous lesion  Impression 1  Significant diffuse dilatation of the esophagus which contains residual food material and/or secretions  Dilatation extends from the level of the thoracic inlet to the level of the esophagogastric junction, with specific measurements provided above 2  Nonspecific scattered groundglass opacities within the lungs most pronounced within the superior segment of the right lower lobe  An inflammatory or low-grade infectious process is favored  Consider reassessment CT in 3-6 months time to establish baseline  3  No suspicious adenopathy or focal pulmonary masses/nodules The examination demonstrates a finding requiring imaging follow-up and was logged as such in [address] [place] [address] [employer]  Workstation performed: [id number]     No CT Chest,Abdomen,Pelvis results available for this patient  No NM PET CT results available for this patient  Fl Esophagram Complete    Result Date: 5/23/2019  Narrative BARIUM SWALLOW-ESOPHAGRAM INDICATION:   pod#1 heller myotomy  COMPARISON:  Upper GI series 4/26/2019 IMAGES:  6 FLUOROSCOPY TIME:   1 87 minutes  TECHNIQUE: The patient was given water-soluble contrast by mouth and images of the esophagus and stomach were obtained  The patient was also given trace thin barium  FINDINGS: Dilated patulous esophagus is again seen in keeping with known achalasia  Contrast does pass freely through the gastroesophageal junction into the stomach    There is no extraluminal contrast  Impression 1  No evidence of leak  2   Dilated, patulous esophagus keeping with known achalasia  Contrast does pass freely through the gastroesophageal junction   Workstation performed: [id number]

## 2019-07-19 NOTE — PROGRESS NOTES
Thoracic Follow-Up  Assessment/Plan:    Achalasia  Ms Lex Garcia  Is a 22-year-old female who is well known to me  She is status post laparoscopic Heller myotomy on 05/22/2019  She presents for her 2nd postoperative visit  I am overall very happy with her progress since surgery  She is doing well and tolerating a diet without any restrictions  Today in the office, I lifted all of her postoperative restrictions  She can swim and she has no lifting restrictions at this time  She will follow up with me in 6 months without any studies just to ensure continued success of the surgery  She knows to call my office if there are any changes in her status before that time  Ayanna Marks MD  Thoracic Surgery  (Available on [employer])  Office: [phone]         Diagnoses and all orders for this visit:    Achalasia          Thoracic History          Subjective:    Patient ID: Lanice Aschoff is a 40 y o  female  HPI  Ms Lex Garcia  Is a 22-year-old female who is well known to me  She is status post laparoscopic Heller myotomy on 05/22/2019  She presents for her 2nd postoperative visit  Today in the office, she states that she is doing well that her swallowing is grade she denies any dysphagia  She reports that sometimes she over eats and she will feel nauseous at the end of a big meal   Otherwise though, she says that she feels good  Her activities are back to normal and she has resumed working without a problem  She states that her energy is great and she has more energy now than she previously did before the surgery  The following portions of the patient's history were reviewed and updated as appropriate: allergies, current medications, past family history, past medical history, past social history, past surgical history and problem list     Review of Systems   Constitutional: Negative for chills, fatigue, fever and unexpected weight change  HENT: Negative  Eyes: Negative    Negative for visual disturbance  Respiratory: Negative for cough, shortness of breath and stridor  Cardiovascular: Negative for chest pain  Gastrointestinal: Negative  Negative for abdominal pain, constipation, diarrhea, nausea and vomiting  Endocrine: Negative  Genitourinary: Negative  Musculoskeletal: Negative  Skin: Negative  Neurological: Negative for dizziness, light-headedness and headaches  Hematological: Negative for adenopathy  Psychiatric/Behavioral: Negative  Objective:   Physical Exam   Constitutional: She is oriented to person, place, and time  She appears well-developed and well-nourished  HENT:   Head: Normocephalic and atraumatic  Eyes: Pupils are equal, round, and reactive to light  Neck: Normal range of motion  No tracheal deviation present  Cardiovascular: Normal rate, regular rhythm and normal heart sounds  No murmur heard  Pulmonary/Chest: Effort normal and breath sounds normal  No stridor  No respiratory distress  She has no wheezes  She has no rales  She exhibits no tenderness  Abdominal: Soft  Bowel sounds are normal  She exhibits no distension  There is no tenderness  There is no rebound  Incisions well healed   Musculoskeletal: Normal range of motion  She exhibits no edema  Lymphadenopathy:     She has no cervical adenopathy  Neurological: She is alert and oriented to person, place, and time  Skin: Skin is warm and dry  No rash noted  She is not diaphoretic  No erythema  No pallor  Psychiatric: She has a normal mood and affect  Her behavior is normal  Judgment and thought content normal    Nursing note and vitals reviewed  /80 (BP Location: Left arm, Patient Position: Sitting, Cuff Size: Adult)   Pulse 86   Temp 98 1 °F (36 7 °C) (Oral)   Ht 5' 2" (1 575 m)   Wt 71 2 kg (157 lb)   SpO2 99%   BMI 28 72 kg/m²     No Chest XR results available for this patient     Ct Chest Wo Contrast    Result Date: 5/6/2019  Narrative CT CHEST WITHOUT IV CONTRAST INDICATION:   K22 0: Achalasia of cardia  Further evaluation of esophageal size COMPARISON:  No prior CT studies of the chest   Upper GI, 4/26/2019 TECHNIQUE: CT examination of the chest was performed without intravenous contrast   Axial, sagittal, and coronal 2D reformatted images were created from the source data and submitted for interpretation  Radiation dose length product (DLP) for this visit:  210 48 mGy-cm   This examination, like all CT scans performed in the [facility], was performed utilizing techniques to minimize radiation dose exposure, including the use of iterative  reconstruction and automated exposure control  FINDINGS: LUNGS:  There are no suspicious pulmonary nodules or regions of airspace consolidation  There are scattered ill-defined groundglass opacities within the lungs  For example, the changes are most pronounced within the superior segment of the right lower lobe (for example image 3/52, coronal image 114) PLEURA:  Unremarkable  HEART/GREAT VESSELS:  Unremarkable for patient's age  MEDIASTINUM AND STEPHAN:  There is no adenopathy  There is significant diffuse dilatation of the esophagus, with retention of food material or secretions throughout its length  The dilated esophagus extends from the level of the thoracic inlet, above the level of the clavicles, to the level of the esophagogastric junction  The largest diameter is noted at the level of the inferior pulmonary veins, on image 2/37 the diameter is 7 5 x 5 2 cm  At the level of the reynaldo the diameter is 4 2 x 4 4 cm  At the level of the superior margin of the aortic arch the diameter is 4 2 x 4 4 cm  There is abrupt tapering at the esophagogastric junction, with normal diameter of stomach CHEST WALL AND LOWER NECK:   Unremarkable  VISUALIZED STRUCTURES IN THE UPPER ABDOMEN:  Gallbladder is surgically absent OSSEOUS STRUCTURES:  No acute fracture or destructive osseous lesion  Impression 1  Significant diffuse dilatation of the esophagus which contains residual food material and/or secretions  Dilatation extends from the level of the thoracic inlet to the level of the esophagogastric junction, with specific measurements provided above 2  Nonspecific scattered groundglass opacities within the lungs most pronounced within the superior segment of the right lower lobe  An inflammatory or low-grade infectious process is favored  Consider reassessment CT in 3-6 months time to establish baseline  3  No suspicious adenopathy or focal pulmonary masses/nodules The examination demonstrates a finding requiring imaging follow-up and was logged as such in [address] [place] [address] Rd  Workstation performed: [id number]     No CT Chest,Abdomen,Pelvis results available for this patient  No NM PET CT results available for this patient  Fl Esophagram Complete    Result Date: 5/23/2019  Narrative BARIUM SWALLOW-ESOPHAGRAM INDICATION:   pod#1 heller myotomy  COMPARISON:  Upper GI series 4/26/2019 IMAGES:  6 FLUOROSCOPY TIME:   1 87 minutes  TECHNIQUE: The patient was given water-soluble contrast by mouth and images of the esophagus and stomach were obtained  The patient was also given trace thin barium  FINDINGS: Dilated patulous esophagus is again seen in keeping with known achalasia  Contrast does pass freely through the gastroesophageal junction into the stomach  There is no extraluminal contrast      Impression 1  No evidence of leak  2   Dilated, patulous esophagus keeping with known achalasia  Contrast does pass freely through the gastroesophageal junction   Workstation performed: [id number]

## 2019-07-19 NOTE — LETTER
July 19, 2019     Patient: Shahida Duarte   YOB: 1975   Date of Visit: 7/19/2019       To Whom it May Concern:    Shahida Duarte is under my professional care  She was seen in my office on 7/19/2019  All restrictions after surgery have been lifted at this time  If you have any questions or concerns, please don't hesitate to call           Sincerely,          Lashawn Duffy MD        CC: No Recipients

## 2019-07-19 NOTE — ASSESSMENT & PLAN NOTE
Ms Nick Montemayor  Is a 60-year-old female who is well known to me  She is status post laparoscopic Heller myotomy on 05/22/2019  She presents for her 2nd postoperative visit  I am overall very happy with her progress since surgery  She is doing well and tolerating a diet without any restrictions  Today in the office, I lifted all of her postoperative restrictions  She can swim and she has no lifting restrictions at this time  She will follow up with me in 6 months without any studies just to ensure continued success of the surgery  She knows to call my office if there are any changes in her status before that time      Naheed Jackson MD  Thoracic Surgery  (Available on [employer])  Office: [phone]

## 2020-01-03 ENCOUNTER — OFFICE VISIT (OUTPATIENT)
Dept: CARDIAC SURGERY | Facility: CLINIC | Age: 45
End: 2020-01-03
Payer: COMMERCIAL

## 2020-01-03 VITALS
OXYGEN SATURATION: 99 % | DIASTOLIC BLOOD PRESSURE: 62 MMHG | BODY MASS INDEX: 33.77 KG/M2 | WEIGHT: 183.5 LBS | TEMPERATURE: 99.8 F | SYSTOLIC BLOOD PRESSURE: 98 MMHG | HEIGHT: 62 IN | HEART RATE: 66 BPM

## 2020-01-03 DIAGNOSIS — K22.0 ACHALASIA: Primary | ICD-10-CM

## 2020-01-03 PROCEDURE — 99213 OFFICE O/P EST LOW 20 MIN: CPT | Performed by: THORACIC SURGERY (CARDIOTHORACIC VASCULAR SURGERY)

## 2020-01-03 RX ORDER — LANOLIN ALCOHOL/MO/W.PET/CERES
1 CREAM (GRAM) TOPICAL DAILY
COMMUNITY

## 2020-01-03 NOTE — LETTER
January 3, 2020     Deann Messer DO  [address], [address] [address]    Patient: Dirk Rudolph   YOB: 1975   Date of Visit: 1/3/2020       Dear Dr Mark Sepulveda: Thank you for referring Dirk Rudolph to me for evaluation  Below are my notes for this consultation  If you have questions, please do not hesitate to call me  I look forward to following your patient along with you  Sincerely,        Sarah Mccoy MD        CC: DO Sarah Baltazar MD  1/3/2020 10:14 AM  Incomplete  Thoracic Follow-Up  Assessment/Plan:    Achalasia  Ms Shanika Cuevas is a very pleasant 60-year-old female who is well known to me  She is status post laparoscopic Heller myotomy with Juventino fundoplication on 66/97/5250  She presents to my office today for a six-month follow-up visit  I am overall very happy with her progress  She is tolerating all foods and she denies any type of globus sensation  She denies any reflux which can often be significant in the post Heller myotomy patient population  I am not too concerned about the occasional nausea  I told her that if this worsens, she should call our office  At this time, I told her that she can follow up in my office on an as-needed basis  She will also follow up with Dr Lucia Oropeza of Gastroenterology for any regularly scheduled endoscopic procedures  Pauline Howell MD  Thoracic Surgery  (Available on [employer])  Office: [phone]         Diagnoses and all orders for this visit:    Achalasia    Other orders  -     glucosamine-chondroitin 500-400 MG tablet; Take 1 tablet by mouth daily           Thoracic History     Procedure: lap heller myotomy and Juventino Fundoplication  Date: 4/57/7951      Subjective:    Patient ID: Dirk Rudolph is a 40 y o  female  HPI  Ms Shanika Cuevas is a very pleasant 60-year-old female who is well known to me  She is status post laparoscopic Heller myotomy with Juventino fundoplication on 53/62/9821  She presents to my office today for a six-month follow-up visit  She states that she is eating well and that she has gained weight since her surgery  She denies any symptoms of reflux and is not taking any type of anti acid medication  She states that she has occasional nausea over the past week her to but relates this to eating large amount of food  She denies any emesis  She denies dysphagia or a globus sensation, although she still cannot eat a lot of bread without feeling like it gets stuck  The following portions of the patient's history were reviewed and updated as appropriate: allergies, current medications, past family history, past medical history, past social history, past surgical history and problem list     Review of Systems   Constitutional: Negative for chills, fatigue, fever and unexpected weight change  HENT: Negative  Eyes: Negative  Negative for visual disturbance  Respiratory: Negative for cough, shortness of breath and stridor  Cardiovascular: Negative for chest pain  Gastrointestinal: Positive for nausea (Occasional and only over the past 1-2 weeks)  Negative for abdominal distention, abdominal pain, constipation, diarrhea and vomiting  Endocrine: Negative  Genitourinary: Negative  Musculoskeletal: Negative  Skin: Negative  Neurological: Negative for dizziness, light-headedness and headaches  Hematological: Negative for adenopathy  Psychiatric/Behavioral: Negative  Objective:   Physical Exam   Constitutional: She is oriented to person, place, and time  She appears well-developed and well-nourished  HENT:   Head: Normocephalic and atraumatic  Eyes: Pupils are equal, round, and reactive to light  Neck: Normal range of motion  No tracheal deviation present  Cardiovascular: Normal rate, regular rhythm and normal heart sounds  No murmur heard  Pulmonary/Chest: Effort normal and breath sounds normal  No stridor  No respiratory distress   She has no wheezes  She has no rales  She exhibits no tenderness  Abdominal: Soft  Bowel sounds are normal  She exhibits no distension  There is no tenderness  There is no rebound  Musculoskeletal: Normal range of motion  She exhibits no edema  Lymphadenopathy:     She has no cervical adenopathy  Neurological: She is alert and oriented to person, place, and time  Skin: Skin is warm and dry  No rash noted  She is not diaphoretic  No erythema  No pallor  Psychiatric: She has a normal mood and affect  Her behavior is normal  Judgment and thought content normal    Nursing note and vitals reviewed  BP 98/62 (BP Location: Right arm, Patient Position: Sitting, Cuff Size: Large)   Pulse 66   Temp 99 8 °F (37 7 °C) (Oral)   Ht 5' 2" (1 575 m)   Wt 83 2 kg (183 lb 8 oz)   SpO2 99%   BMI 33 56 kg/m²      No Chest XR results available for this patient  Ct Chest Wo Contrast    Result Date: 5/6/2019  Narrative CT CHEST WITHOUT IV CONTRAST INDICATION:   K22 0: Achalasia of cardia  Further evaluation of esophageal size COMPARISON:  No prior CT studies of the chest   Upper GI, 4/26/2019 TECHNIQUE: CT examination of the chest was performed without intravenous contrast   Axial, sagittal, and coronal 2D reformatted images were created from the source data and submitted for interpretation  Radiation dose length product (DLP) for this visit:  210 48 mGy-cm   This examination, like all CT scans performed in the [facility], was performed utilizing techniques to minimize radiation dose exposure, including the use of iterative  reconstruction and automated exposure control  FINDINGS: LUNGS:  There are no suspicious pulmonary nodules or regions of airspace consolidation  There are scattered ill-defined groundglass opacities within the lungs  For example, the changes are most pronounced within the superior segment of the right lower lobe (for example image 3/52, coronal image 114) PLEURA:  Unremarkable  HEART/GREAT VESSELS:  Unremarkable for patient's age  MEDIASTINUM AND STEPHAN:  There is no adenopathy  There is significant diffuse dilatation of the esophagus, with retention of food material or secretions throughout its length  The dilated esophagus extends from the level of the thoracic inlet, above the level of the clavicles, to the level of the esophagogastric junction  The largest diameter is noted at the level of the inferior pulmonary veins, on image 2/37 the diameter is 7 5 x 5 2 cm  At the level of the reynaldo the diameter is 4 2 x 4 4 cm  At the level of the superior margin of the aortic arch the diameter is 4 2 x 4 4 cm  There is abrupt tapering at the esophagogastric junction, with normal diameter of stomach CHEST WALL AND LOWER NECK:   Unremarkable  VISUALIZED STRUCTURES IN THE UPPER ABDOMEN:  Gallbladder is surgically absent OSSEOUS STRUCTURES:  No acute fracture or destructive osseous lesion  Impression 1  Significant diffuse dilatation of the esophagus which contains residual food material and/or secretions  Dilatation extends from the level of the thoracic inlet to the level of the esophagogastric junction, with specific measurements provided above 2  Nonspecific scattered groundglass opacities within the lungs most pronounced within the superior segment of the right lower lobe  An inflammatory or low-grade infectious process is favored  Consider reassessment CT in 3-6 months time to establish baseline  3  No suspicious adenopathy or focal pulmonary masses/nodules The examination demonstrates a finding requiring imaging follow-up and was logged as such in [address] [place] [address] [employer]  Workstation performed: [id number]     No CT Chest,Abdomen,Pelvis results available for this patient  No NM PET CT results available for this patient  Fl Esophagram Complete    Result Date: 5/23/2019  Narrative BARIUM SWALLOW-ESOPHAGRAM INDICATION:   pod#1 heller myotomy   COMPARISON:  Upper GI series 4/26/2019 IMAGES:  6 FLUOROSCOPY TIME:   1 87 minutes  TECHNIQUE: The patient was given water-soluble contrast by mouth and images of the esophagus and stomach were obtained  The patient was also given trace thin barium  FINDINGS: Dilated patulous esophagus is again seen in keeping with known achalasia  Contrast does pass freely through the gastroesophageal junction into the stomach  There is no extraluminal contrast      Impression 1  No evidence of leak  2   Dilated, patulous esophagus keeping with known achalasia  Contrast does pass freely through the gastroesophageal junction   Workstation performed: [id number]

## 2020-01-03 NOTE — ASSESSMENT & PLAN NOTE
Ms Elias Degroot is a very pleasant 51-year-old female who is well known to me  She is status post laparoscopic Heller myotomy with Juventino fundoplication on 47/47/6658  She presents to my office today for a six-month follow-up visit  I am overall very happy with her progress  She is tolerating all foods and she denies any type of globus sensation  She denies any reflux which can often be significant in the post Heller myotomy patient population  I am not too concerned about the occasional nausea  I told her that if this worsens, she should call our office  At this time, I told her that she can follow up in my office on an as-needed basis  She will also follow up with Dr Zachary Bell of Gastroenterology for any regularly scheduled endoscopic procedures      Eduard Simons MD  Thoracic Surgery  (Available on [employer])  Office: [phone]

## 2020-01-03 NOTE — PROGRESS NOTES
Thoracic Follow-Up  Assessment/Plan:    Achalasia  Ms Lorrie Jackman is a very pleasant 42-year-old female who is well known to me  She is status post laparoscopic Heller myotomy with Juventino fundoplication on 79/51/0040  She presents to my office today for a six-month follow-up visit  I am overall very happy with her progress  She is tolerating all foods and she denies any type of globus sensation  She denies any reflux which can often be significant in the post Heller myotomy patient population  I am not too concerned about the occasional nausea  I told her that if this worsens, she should call our office  At this time, I told her that she can follow up in my office on an as-needed basis  She will also follow up with Dr Kleber Tovar of Gastroenterology for any regularly scheduled endoscopic procedures  Rachel Montgomery MD  Thoracic Surgery  (Available on [employer])  Office: [phone]         Diagnoses and all orders for this visit:    Achalasia    Other orders  -     glucosamine-chondroitin 500-400 MG tablet; Take 1 tablet by mouth daily           Thoracic History     Procedure: lap heller myotomy and Juventino Fundoplication  Date: 4/81/7168      Subjective:    Patient ID: Oliver Farias is a 40 y o  female  HPI  Ms Lorrie Jackman is a very pleasant 42-year-old female who is well known to me  She is status post laparoscopic Heller myotomy with Juventino fundoplication on 61/86/7234  She presents to my office today for a six-month follow-up visit  She states that she is eating well and that she has gained weight since her surgery  She denies any symptoms of reflux and is not taking any type of anti acid medication  She states that she has occasional nausea over the past week her to but relates this to eating large amount of food  She denies any emesis  She denies dysphagia or a globus sensation, although she still cannot eat a lot of bread without feeling like it gets stuck      The following portions of the patient's history were reviewed and updated as appropriate: allergies, current medications, past family history, past medical history, past social history, past surgical history and problem list     Review of Systems   Constitutional: Negative for chills, fatigue, fever and unexpected weight change  HENT: Negative  Eyes: Negative  Negative for visual disturbance  Respiratory: Negative for cough, shortness of breath and stridor  Cardiovascular: Negative for chest pain  Gastrointestinal: Positive for nausea (Occasional and only over the past 1-2 weeks)  Negative for abdominal distention, abdominal pain, constipation, diarrhea and vomiting  Endocrine: Negative  Genitourinary: Negative  Musculoskeletal: Negative  Skin: Negative  Neurological: Negative for dizziness, light-headedness and headaches  Hematological: Negative for adenopathy  Psychiatric/Behavioral: Negative  Objective:   Physical Exam   Constitutional: She is oriented to person, place, and time  She appears well-developed and well-nourished  HENT:   Head: Normocephalic and atraumatic  Eyes: Pupils are equal, round, and reactive to light  Neck: Normal range of motion  No tracheal deviation present  Cardiovascular: Normal rate, regular rhythm and normal heart sounds  No murmur heard  Pulmonary/Chest: Effort normal and breath sounds normal  No stridor  No respiratory distress  She has no wheezes  She has no rales  She exhibits no tenderness  Abdominal: Soft  Bowel sounds are normal  She exhibits no distension  There is no tenderness  There is no rebound  Musculoskeletal: Normal range of motion  She exhibits no edema  Lymphadenopathy:     She has no cervical adenopathy  Neurological: She is alert and oriented to person, place, and time  Skin: Skin is warm and dry  No rash noted  She is not diaphoretic  No erythema  No pallor  Psychiatric: She has a normal mood and affect   Her behavior is normal  Judgment and thought content normal    Nursing note and vitals reviewed  BP 98/62 (BP Location: Right arm, Patient Position: Sitting, Cuff Size: Large)   Pulse 66   Temp 99 8 °F (37 7 °C) (Oral)   Ht 5' 2" (1 575 m)   Wt 83 2 kg (183 lb 8 oz)   SpO2 99%   BMI 33 56 kg/m²     No Chest XR results available for this patient  Ct Chest Wo Contrast    Result Date: 5/6/2019  Narrative CT CHEST WITHOUT IV CONTRAST INDICATION:   K22 0: Achalasia of cardia  Further evaluation of esophageal size COMPARISON:  No prior CT studies of the chest   Upper GI, 4/26/2019 TECHNIQUE: CT examination of the chest was performed without intravenous contrast   Axial, sagittal, and coronal 2D reformatted images were created from the source data and submitted for interpretation  Radiation dose length product (DLP) for this visit:  210 48 mGy-cm   This examination, like all CT scans performed in the [facility], was performed utilizing techniques to minimize radiation dose exposure, including the use of iterative  reconstruction and automated exposure control  FINDINGS: LUNGS:  There are no suspicious pulmonary nodules or regions of airspace consolidation  There are scattered ill-defined groundglass opacities within the lungs  For example, the changes are most pronounced within the superior segment of the right lower lobe (for example image 3/52, coronal image 114) PLEURA:  Unremarkable  HEART/GREAT VESSELS:  Unremarkable for patient's age  MEDIASTINUM AND STEPHAN:  There is no adenopathy  There is significant diffuse dilatation of the esophagus, with retention of food material or secretions throughout its length  The dilated esophagus extends from the level of the thoracic inlet, above the level of the clavicles, to the level of the esophagogastric junction  The largest diameter is noted at the level of the inferior pulmonary veins, on image 2/37 the diameter is 7 5 x 5 2 cm    At the level of the reynaldo the diameter is 4 2 x 4 4 cm   At the level of the superior margin of the aortic arch the diameter is 4 2 x 4 4 cm  There is abrupt tapering at the esophagogastric junction, with normal diameter of stomach CHEST WALL AND LOWER NECK:   Unremarkable  VISUALIZED STRUCTURES IN THE UPPER ABDOMEN:  Gallbladder is surgically absent OSSEOUS STRUCTURES:  No acute fracture or destructive osseous lesion  Impression 1  Significant diffuse dilatation of the esophagus which contains residual food material and/or secretions  Dilatation extends from the level of the thoracic inlet to the level of the esophagogastric junction, with specific measurements provided above 2  Nonspecific scattered groundglass opacities within the lungs most pronounced within the superior segment of the right lower lobe  An inflammatory or low-grade infectious process is favored  Consider reassessment CT in 3-6 months time to establish baseline  3  No suspicious adenopathy or focal pulmonary masses/nodules The examination demonstrates a finding requiring imaging follow-up and was logged as such in [address] [place] [address] [employer]  Workstation performed: [id number]     No CT Chest,Abdomen,Pelvis results available for this patient  No NM PET CT results available for this patient  Fl Esophagram Complete    Result Date: 5/23/2019  Narrative BARIUM SWALLOW-ESOPHAGRAM INDICATION:   pod#1 heller myotomy  COMPARISON:  Upper GI series 4/26/2019 IMAGES:  6 FLUOROSCOPY TIME:   1 87 minutes  TECHNIQUE: The patient was given water-soluble contrast by mouth and images of the esophagus and stomach were obtained  The patient was also given trace thin barium  FINDINGS: Dilated patulous esophagus is again seen in keeping with known achalasia  Contrast does pass freely through the gastroesophageal junction into the stomach  There is no extraluminal contrast      Impression 1  No evidence of leak  2   Dilated, patulous esophagus keeping with known achalasia    Contrast does pass freely through the gastroesophageal junction   Workstation performed: [id number]

## 2021-01-22 DIAGNOSIS — Z23 ENCOUNTER FOR IMMUNIZATION: ICD-10-CM

## 2021-01-27 ENCOUNTER — IMMUNIZATIONS (OUTPATIENT)
Dept: FAMILY MEDICINE CLINIC | Facility: HOSPITAL | Age: 46
End: 2021-01-27

## 2021-01-27 DIAGNOSIS — Z23 ENCOUNTER FOR IMMUNIZATION: Primary | ICD-10-CM

## 2021-01-27 PROCEDURE — 91301 SARS-COV-2 / COVID-19 MRNA VACCINE (MODERNA) 100 MCG: CPT

## 2021-01-27 PROCEDURE — 0011A SARS-COV-2 / COVID-19 MRNA VACCINE (MODERNA) 100 MCG: CPT

## 2021-02-24 ENCOUNTER — IMMUNIZATIONS (OUTPATIENT)
Dept: FAMILY MEDICINE CLINIC | Facility: HOSPITAL | Age: 46
End: 2021-02-24

## 2021-02-24 DIAGNOSIS — Z23 ENCOUNTER FOR IMMUNIZATION: Primary | ICD-10-CM

## 2021-02-24 PROCEDURE — 91301 SARS-COV-2 / COVID-19 MRNA VACCINE (MODERNA) 100 MCG: CPT

## 2021-02-24 PROCEDURE — 0012A SARS-COV-2 / COVID-19 MRNA VACCINE (MODERNA) 100 MCG: CPT

## 2022-11-19 ENCOUNTER — ANESTHESIA EVENT (OUTPATIENT)
Dept: PERIOP | Facility: HOSPITAL | Age: 47
End: 2022-11-19

## 2022-11-19 ENCOUNTER — ANESTHESIA (OUTPATIENT)
Dept: PERIOP | Facility: HOSPITAL | Age: 47
End: 2022-11-19

## 2022-11-19 ENCOUNTER — HOSPITAL ENCOUNTER (OUTPATIENT)
Facility: HOSPITAL | Age: 47
Setting detail: OBSERVATION
Discharge: HOME | End: 2022-11-19
Attending: EMERGENCY MEDICINE | Admitting: SURGERY

## 2022-11-19 ENCOUNTER — APPOINTMENT (EMERGENCY)
Dept: CT IMAGING | Facility: HOSPITAL | Age: 47
End: 2022-11-19

## 2022-11-19 VITALS
OXYGEN SATURATION: 97 % | TEMPERATURE: 98.9 F | BODY MASS INDEX: 37.49 KG/M2 | RESPIRATION RATE: 17 BRPM | DIASTOLIC BLOOD PRESSURE: 76 MMHG | HEART RATE: 89 BPM | WEIGHT: 205 LBS | SYSTOLIC BLOOD PRESSURE: 118 MMHG

## 2022-11-19 DIAGNOSIS — D72.829 LEUKOCYTOSIS: ICD-10-CM

## 2022-11-19 DIAGNOSIS — L02.31 GLUTEAL ABSCESS: Primary | ICD-10-CM

## 2022-11-19 DIAGNOSIS — K61.0 PERIANAL ABSCESS: ICD-10-CM

## 2022-11-19 DIAGNOSIS — L02.215 PERINEAL ABSCESS: ICD-10-CM

## 2022-11-19 PROBLEM — K61.1 PERIRECTAL ABSCESS: Status: RESOLVED | Noted: 2022-11-19 | Resolved: 2022-11-19

## 2022-11-19 PROBLEM — K61.1 PERIRECTAL ABSCESS: Status: ACTIVE | Noted: 2022-11-19

## 2022-11-19 LAB
ANION GAP SERPL CALCULATED.3IONS-SCNC: 12 MMOL/L (ref 4–13)
BASOPHILS # BLD AUTO: 0.05 THOUSANDS/ÂΜL (ref 0–0.1)
BASOPHILS NFR BLD AUTO: 0 % (ref 0–1)
BUN SERPL-MCNC: 10 MG/DL (ref 5–25)
CALCIUM SERPL-MCNC: 9.3 MG/DL (ref 8.3–10.1)
CHLORIDE SERPL-SCNC: 103 MMOL/L (ref 96–108)
CO2 SERPL-SCNC: 26 MMOL/L (ref 21–32)
CREAT SERPL-MCNC: 0.8 MG/DL (ref 0.6–1.3)
EOSINOPHIL # BLD AUTO: 0.1 THOUSAND/ÂΜL (ref 0–0.61)
EOSINOPHIL NFR BLD AUTO: 1 % (ref 0–6)
ERYTHROCYTE [DISTWIDTH] IN BLOOD BY AUTOMATED COUNT: 12.4 % (ref 11.6–15.1)
EXT PREGNANCY TEST URINE: NEGATIVE
EXT. CONTROL: NORMAL
GFR SERPL CREATININE-BSD FRML MDRD: 88 ML/MIN/1.73SQ M
GLUCOSE SERPL-MCNC: 90 MG/DL (ref 65–140)
HCT VFR BLD AUTO: 39.9 % (ref 34.8–46.1)
HGB BLD-MCNC: 13.1 G/DL (ref 11.5–15.4)
IMM GRANULOCYTES # BLD AUTO: 0.03 THOUSAND/UL (ref 0–0.2)
IMM GRANULOCYTES NFR BLD AUTO: 0 % (ref 0–2)
LACTATE SERPL-SCNC: 0.8 MMOL/L (ref 0.5–2)
LYMPHOCYTES # BLD AUTO: 1.13 THOUSANDS/ÂΜL (ref 0.6–4.47)
LYMPHOCYTES NFR BLD AUTO: 9 % (ref 14–44)
MCH RBC QN AUTO: 31.3 PG (ref 26.8–34.3)
MCHC RBC AUTO-ENTMCNC: 32.8 G/DL (ref 31.4–37.4)
MCV RBC AUTO: 96 FL (ref 82–98)
MONOCYTES # BLD AUTO: 1.26 THOUSAND/ÂΜL (ref 0.17–1.22)
MONOCYTES NFR BLD AUTO: 11 % (ref 4–12)
NEUTROPHILS # BLD AUTO: 9.46 THOUSANDS/ÂΜL (ref 1.85–7.62)
NEUTS SEG NFR BLD AUTO: 79 % (ref 43–75)
NRBC BLD AUTO-RTO: 0 /100 WBCS
PLATELET # BLD AUTO: 267 THOUSANDS/UL (ref 149–390)
PMV BLD AUTO: 10.4 FL (ref 8.9–12.7)
POTASSIUM SERPL-SCNC: 4 MMOL/L (ref 3.5–5.3)
RBC # BLD AUTO: 4.18 MILLION/UL (ref 3.81–5.12)
SODIUM SERPL-SCNC: 141 MMOL/L (ref 135–147)
WBC # BLD AUTO: 12.03 THOUSAND/UL (ref 4.31–10.16)

## 2022-11-19 RX ORDER — ONDANSETRON 2 MG/ML
4 INJECTION INTRAMUSCULAR; INTRAVENOUS ONCE AS NEEDED
Status: DISCONTINUED | OUTPATIENT
Start: 2022-11-19 | End: 2022-11-19 | Stop reason: HOSPADM

## 2022-11-19 RX ORDER — FENTANYL CITRATE 50 UG/ML
INJECTION, SOLUTION INTRAMUSCULAR; INTRAVENOUS AS NEEDED
Status: DISCONTINUED | OUTPATIENT
Start: 2022-11-19 | End: 2022-11-19

## 2022-11-19 RX ORDER — MORPHINE SULFATE 4 MG/ML
3 INJECTION, SOLUTION INTRAMUSCULAR; INTRAVENOUS EVERY 2 HOUR PRN
Status: DISCONTINUED | OUTPATIENT
Start: 2022-11-19 | End: 2022-11-19 | Stop reason: HOSPADM

## 2022-11-19 RX ORDER — ONDANSETRON 2 MG/ML
INJECTION INTRAMUSCULAR; INTRAVENOUS AS NEEDED
Status: DISCONTINUED | OUTPATIENT
Start: 2022-11-19 | End: 2022-11-19

## 2022-11-19 RX ORDER — HYDROMORPHONE HCL/PF 1 MG/ML
0.5 SYRINGE (ML) INJECTION
Status: DISCONTINUED | OUTPATIENT
Start: 2022-11-19 | End: 2022-11-19 | Stop reason: HOSPADM

## 2022-11-19 RX ORDER — ACETAMINOPHEN 325 MG/1
650 TABLET ORAL EVERY 6 HOURS SCHEDULED
Qty: 20 TABLET | Refills: 0 | Status: SHIPPED | OUTPATIENT
Start: 2022-11-19

## 2022-11-19 RX ORDER — MIDAZOLAM HYDROCHLORIDE 2 MG/2ML
INJECTION, SOLUTION INTRAMUSCULAR; INTRAVENOUS AS NEEDED
Status: DISCONTINUED | OUTPATIENT
Start: 2022-11-19 | End: 2022-11-19

## 2022-11-19 RX ORDER — HEPARIN SODIUM 5000 [USP'U]/ML
5000 INJECTION, SOLUTION INTRAVENOUS; SUBCUTANEOUS EVERY 8 HOURS SCHEDULED
Status: DISCONTINUED | OUTPATIENT
Start: 2022-11-19 | End: 2022-11-19 | Stop reason: HOSPADM

## 2022-11-19 RX ORDER — OXYCODONE HYDROCHLORIDE 10 MG/1
10 TABLET ORAL EVERY 6 HOURS PRN
Status: DISCONTINUED | OUTPATIENT
Start: 2022-11-19 | End: 2022-11-19 | Stop reason: HOSPADM

## 2022-11-19 RX ORDER — OXYCODONE HYDROCHLORIDE 5 MG/1
5 TABLET ORAL EVERY 4 HOURS PRN
Status: DISCONTINUED | OUTPATIENT
Start: 2022-11-19 | End: 2022-11-19 | Stop reason: HOSPADM

## 2022-11-19 RX ORDER — METOCLOPRAMIDE HYDROCHLORIDE 5 MG/ML
10 INJECTION INTRAMUSCULAR; INTRAVENOUS ONCE AS NEEDED
Status: DISCONTINUED | OUTPATIENT
Start: 2022-11-19 | End: 2022-11-19 | Stop reason: HOSPADM

## 2022-11-19 RX ORDER — ACETAMINOPHEN 325 MG/1
975 TABLET ORAL EVERY 8 HOURS PRN
Status: DISCONTINUED | OUTPATIENT
Start: 2022-11-19 | End: 2022-11-19 | Stop reason: HOSPADM

## 2022-11-19 RX ORDER — OXYCODONE HYDROCHLORIDE 5 MG/1
5 TABLET ORAL EVERY 4 HOURS PRN
Qty: 18 TABLET | Refills: 0 | Status: SHIPPED | OUTPATIENT
Start: 2022-11-19 | End: 2022-11-22

## 2022-11-19 RX ORDER — PROPOFOL 10 MG/ML
INJECTION, EMULSION INTRAVENOUS AS NEEDED
Status: DISCONTINUED | OUTPATIENT
Start: 2022-11-19 | End: 2022-11-19

## 2022-11-19 RX ORDER — FENTANYL CITRATE/PF 50 MCG/ML
25 SYRINGE (ML) INJECTION
Status: DISCONTINUED | OUTPATIENT
Start: 2022-11-19 | End: 2022-11-19 | Stop reason: HOSPADM

## 2022-11-19 RX ORDER — MAGNESIUM HYDROXIDE 1200 MG/15ML
LIQUID ORAL AS NEEDED
Status: DISCONTINUED | OUTPATIENT
Start: 2022-11-19 | End: 2022-11-19 | Stop reason: HOSPADM

## 2022-11-19 RX ORDER — ONDANSETRON 2 MG/ML
4 INJECTION INTRAMUSCULAR; INTRAVENOUS EVERY 6 HOURS PRN
Status: DISCONTINUED | OUTPATIENT
Start: 2022-11-19 | End: 2022-11-19 | Stop reason: HOSPADM

## 2022-11-19 RX ORDER — LIDOCAINE HYDROCHLORIDE 10 MG/ML
INJECTION, SOLUTION EPIDURAL; INFILTRATION; INTRACAUDAL; PERINEURAL AS NEEDED
Status: DISCONTINUED | OUTPATIENT
Start: 2022-11-19 | End: 2022-11-19

## 2022-11-19 RX ORDER — SODIUM CHLORIDE, SODIUM LACTATE, POTASSIUM CHLORIDE, CALCIUM CHLORIDE 600; 310; 30; 20 MG/100ML; MG/100ML; MG/100ML; MG/100ML
125 INJECTION, SOLUTION INTRAVENOUS CONTINUOUS
Status: DISCONTINUED | OUTPATIENT
Start: 2022-11-19 | End: 2022-11-19 | Stop reason: HOSPADM

## 2022-11-19 RX ORDER — AMOXICILLIN AND CLAVULANATE POTASSIUM 875; 125 MG/1; MG/1
1 TABLET, FILM COATED ORAL EVERY 12 HOURS SCHEDULED
Qty: 10 TABLET | Refills: 0 | Status: SHIPPED | OUTPATIENT
Start: 2022-11-19 | End: 2022-11-24

## 2022-11-19 RX ADMIN — MORPHINE SULFATE 2 MG: 2 INJECTION, SOLUTION INTRAMUSCULAR; INTRAVENOUS at 16:04

## 2022-11-19 RX ADMIN — PROPOFOL 200 MG: 10 INJECTION, EMULSION INTRAVENOUS at 17:51

## 2022-11-19 RX ADMIN — PIPERACILLIN AND TAZOBACTAM 3.38 G: 36; 4.5 INJECTION, POWDER, FOR SOLUTION INTRAVENOUS at 17:44

## 2022-11-19 RX ADMIN — FENTANYL CITRATE 100 MCG: 50 INJECTION, SOLUTION INTRAMUSCULAR; INTRAVENOUS at 17:49

## 2022-11-19 RX ADMIN — HEPARIN SODIUM 5000 UNITS: 5000 INJECTION INTRAVENOUS; SUBCUTANEOUS at 17:43

## 2022-11-19 RX ADMIN — MIDAZOLAM HYDROCHLORIDE 2 MG: 1 INJECTION, SOLUTION INTRAMUSCULAR; INTRAVENOUS at 17:47

## 2022-11-19 RX ADMIN — FENTANYL CITRATE 25 MCG: 50 INJECTION INTRAMUSCULAR; INTRAVENOUS at 19:05

## 2022-11-19 RX ADMIN — SODIUM CHLORIDE, SODIUM LACTATE, POTASSIUM CHLORIDE, AND CALCIUM CHLORIDE: .6; .31; .03; .02 INJECTION, SOLUTION INTRAVENOUS at 17:10

## 2022-11-19 RX ADMIN — MORPHINE SULFATE 2 MG: 2 INJECTION, SOLUTION INTRAMUSCULAR; INTRAVENOUS at 12:33

## 2022-11-19 RX ADMIN — LIDOCAINE HYDROCHLORIDE 50 MG: 10 INJECTION, SOLUTION EPIDURAL; INFILTRATION; INTRACAUDAL; PERINEURAL at 17:49

## 2022-11-19 RX ADMIN — ONDANSETRON 4 MG: 2 INJECTION INTRAMUSCULAR; INTRAVENOUS at 18:01

## 2022-11-19 RX ADMIN — FENTANYL CITRATE 25 MCG: 50 INJECTION INTRAMUSCULAR; INTRAVENOUS at 19:00

## 2022-11-19 RX ADMIN — FENTANYL CITRATE 50 MCG: 50 INJECTION, SOLUTION INTRAMUSCULAR; INTRAVENOUS at 18:32

## 2022-11-19 RX ADMIN — OXYCODONE HYDROCHLORIDE 5 MG: 5 TABLET ORAL at 19:50

## 2022-11-19 RX ADMIN — IOHEXOL 100 ML: 350 INJECTION, SOLUTION INTRAVENOUS at 13:40

## 2022-11-19 RX ADMIN — FENTANYL CITRATE 50 MCG: 50 INJECTION, SOLUTION INTRAMUSCULAR; INTRAVENOUS at 18:10

## 2022-11-19 NOTE — ANESTHESIA PREPROCEDURE EVALUATION
Procedure:  INCISION AND DRAINAGE (I&D) PERINEAL (Buttocks)   Surgery  HPI:  Jenni Ingram is a 52 y o  female who presents to the emergency room complaining of perirectal pain for the past 2 days, pain has been getting progressively worse, she is not able to sit down without having severe pain  She denied having any fever, chills, nausea, vomiting, diarrhea, constipation or any other constitutional symptoms  She has had similar episode a couple years ago which was treated conservatively, the abscess spontaneously drained  She did not have any follow-up and she never had colonoscopy in the past   Assessment:  Perirectal abscess  Morbid obesity  History of achalasia, status post Heller myotomy  Plan:  The patient was advised to undergo incision and drainage of perirectal abscess and open wound packing  I discussed the operative procedure, risks, benefits, alternatives and possible complications, she understood and agreed to proceed  Relevant Problems   ANESTHESIA  no reported issues      GI/HEPATIC  NPO- coffee with cream 6am, nothing since     Current Outpatient Medications   Medication Instructions   • glucosamine-chondroitin 500-400 MG tablet 1 tablet, Oral, Daily     Lab Results   Component Value Date    WBC 12 03 (H) 11/19/2022    HGB 13 1 11/19/2022    HCT 39 9 11/19/2022     11/19/2022    SODIUM 141 11/19/2022    K 4 0 11/19/2022     11/19/2022    CO2 26 11/19/2022    BUN 10 11/19/2022    CREATININE 0 80 11/19/2022    GLUC 90 11/19/2022      CT A/P 11/19/22  There is an abscess in the right perineal region  This appears to be below the pelvic floor musculature  The exact source is uncertain although based on clinical history might originate from the anal region  The predominant portion of the collection   seems to actually be in or near the right vaginal wall      Right renal cyst which appears to contain a small amount of calcium, dependently    Likely a benign finding      Cholecystectomy      Small bilobed appearing left ovary cyst        Physical Exam    Airway    Mallampati score: II         Dental   Comment: edentulous, upper dentures and lower dentures,     Cardiovascular  Rhythm: regular, Rate: normal,     Pulmonary  Breath sounds clear to auscultation,     Other Findings        Anesthesia Plan  ASA Score- 2 Emergent    Anesthesia Type- general with ASA Monitors  Additional Monitors:   Airway Plan: LMA  Plan Factors-Exercise tolerance (METS): >4 METS  Chart reviewed  Imaging results reviewed  Existing labs reviewed  Patient summary reviewed  Patient is not a current smoker  Induction- intravenous  Postoperative Plan- Plan for postoperative opioid use  Informed Consent- Anesthetic plan and risks discussed with patient  I personally reviewed this patient with the CRNA  Discussed and agreed on the Anesthesia Plan with the MANI Valverde

## 2022-11-19 NOTE — ANESTHESIA POSTPROCEDURE EVALUATION
Post-Op Assessment Note    CV Status:  Stable  Pain Score: 0    Pain management: adequate     Mental Status:  Alert and awake   Hydration Status:  Stable   PONV Controlled:  None   Airway Patency:  Patent and adequate      Post Op Vitals Reviewed: Yes      Staff: Anesthesiologist, CRNA         No notable events documented      BP   120/92   Temp 97 9   Pulse 82   Resp   18   SpO2   100%

## 2022-11-19 NOTE — ED PROVIDER NOTES
History  Chief Complaint   Patient presents with   • Rectal Pain     Patient co "anal fissure and cyst " Symptoms started approx 1 week ago  HPI    Prior to Admission Medications   Prescriptions Last Dose Informant Patient Reported? Taking?   glucosamine-chondroitin 500-400 MG tablet   Yes No   Sig: Take 1 tablet by mouth daily       Facility-Administered Medications: None       Past Medical History:   Diagnosis Date   • Achalasia        Past Surgical History:   Procedure Laterality Date   •  SECTION     • CHOLECYSTECTOMY     • [place] LAP, ESOPHAGOMYOTOMY W FUNDOPLASTY N/A 2019    Procedure: LAPAROSCOPIC HELLER MYOTOMY AND PARTIAL FUNDOPLICATION, ALEX;  Surgeon: Veryl Boast, MD;  Location: [facility];  Service: Thoracic   • UPPER GASTROINTESTINAL ENDOSCOPY         Family History   Problem Relation Age of Onset   • Asthma Mother    • Hypertension Mother    • Diabetes Mother    • Hyperlipidemia Mother    • Cancer Father      I have reviewed and agree with the history as documented  E-Cigarette/Vaping     E-Cigarette/Vaping Substances     Social History     Tobacco Use   • Smoking status: Former     Packs/day: 1 00     Years: 10 00     Pack years: 10 00     Types: Cigarettes     Quit date: 2016     Years since quittin 5   • Smokeless tobacco: Current   Substance Use Topics   • Alcohol use: Not Currently   • Drug use: Never       Review of Systems    Physical Exam  Physical Exam  Vitals and nursing note reviewed  Exam conducted with a chaperone present (Foster Sofia RN)  Constitutional:       General: She is not in acute distress  Appearance: She is well-developed and well-nourished  She is obese  HENT:      Head: Normocephalic and atraumatic  Mouth/Throat:      Mouth: Oropharynx is clear and moist    Eyes:      Conjunctiva/sclera: Conjunctivae normal       Pupils: Pupils are equal, round, and reactive to light  Neck:      Trachea: No tracheal deviation  Cardiovascular:      Rate and Rhythm: Normal rate and regular rhythm  Pulses: Intact distal pulses  Heart sounds: Normal heart sounds  Pulmonary:      Effort: Pulmonary effort is normal  No respiratory distress  Breath sounds: Normal breath sounds  Abdominal:      General: Bowel sounds are normal  There is no distension  Palpations: Abdomen is soft  Tenderness: There is no abdominal tenderness  Genitourinary:         Comments: No perianal tenderness  Musculoskeletal:      Cervical back: Normal range of motion  Skin:     General: Skin is warm and dry  Neurological:      Mental Status: She is alert and oriented to person, place, and time  GCS: GCS eye subscore is 4  GCS verbal subscore is 5  GCS motor subscore is 6        Motor: Motor strength is normal    Psychiatric:         Mood and Affect: Mood and affect normal          Behavior: Behavior normal          Vital Signs  ED Triage Vitals [11/19/22 1115]   Temperature Pulse Respirations Blood Pressure SpO2   98 1 °F (36 7 °C) 100 18 163/85 99 %      Temp Source Heart Rate Source Patient Position - Orthostatic VS BP Location FiO2 (%)   Tympanic Monitor Sitting Left arm --      Pain Score       10 - Worst Possible Pain           Vitals:    11/19/22 1732 11/19/22 1838 11/19/22 1845 11/19/22 1900   BP: 131/76 120/100 129/97 135/98   Pulse: 93 93 77 70   Patient Position - Orthostatic VS:             Visual Acuity      ED Medications  Medications   morphine injection 2 mg (2 mg Intravenous Given 11/19/22 1233)   iohexol (OMNIPAQUE) 350 MG/ML injection (SINGLE-DOSE) 100 mL (100 mL Intravenous Given 11/19/22 1340)   morphine injection 2 mg (2 mg Intravenous Given 11/19/22 1604)       Diagnostic Studies  Results Reviewed     Procedure Component Value Units Date/Time    Platelet count [[id number]]     Lab Status: No result Specimen: Blood     Lactic acid [[id number]]  (Normal) Collected: 11/19/22 1232    Lab Status: Final result Specimen: Blood from Arm, Right Updated: 11/19/22 1332     LACTIC ACID 0 8 mmol/L     Narrative:      Result may be elevated if tourniquet was used during collection      Basic metabolic panel [[id number]] Collected: 11/19/22 1232    Lab Status: Final result Specimen: Blood from Arm, Right Updated: 11/19/22 1326     Sodium 141 mmol/L      Potassium 4 0 mmol/L      Chloride 103 mmol/L      CO2 26 mmol/L      ANION GAP 12 mmol/L      BUN 10 mg/dL      Creatinine 0 80 mg/dL      Glucose 90 mg/dL      Calcium 9 3 mg/dL      eGFR 88 ml/min/1 73sq m     Narrative:      [place] guidelines for Chronic Kidney Disease (CKD):   •  Stage 1 with normal or high GFR (GFR > 90 mL/min/1 73 square meters)  •  Stage 2 Mild CKD (GFR = 60-89 mL/min/1 73 square meters)  •  Stage 3A Moderate CKD (GFR = 45-59 mL/min/1 73 square meters)  •  Stage 3B Moderate CKD (GFR = 30-44 mL/min/1 73 square meters)  •  Stage 4 Severe CKD (GFR = 15-29 mL/min/1 73 square meters)  •  Stage 5 End Stage CKD (GFR <15 mL/min/1 73 square meters)  Note: GFR calculation is accurate only with a steady state creatinine    CBC and differential [[id number]]  (Abnormal) Collected: 11/19/22 1232    Lab Status: Final result Specimen: Blood from Arm, Right Updated: 11/19/22 1253     WBC 12 03 Thousand/uL      RBC 4 18 Million/uL      Hemoglobin 13 1 g/dL      Hematocrit 39 9 %      MCV 96 fL      MCH 31 3 pg      MCHC 32 8 g/dL      RDW 12 4 %      MPV 10 4 fL      Platelets 212 Thousands/uL      nRBC 0 /100 WBCs      Neutrophils Relative 79 %      Immat GRANS % 0 %      Lymphocytes Relative 9 %      Monocytes Relative 11 %      Eosinophils Relative 1 %      Basophils Relative 0 %      Neutrophils Absolute 9 46 Thousands/µL      Immature Grans Absolute 0 03 Thousand/uL      Lymphocytes Absolute 1 13 Thousands/µL      Monocytes Absolute 1 26 Thousand/µL      Eosinophils Absolute 0 10 Thousand/µL      Basophils Absolute 0 05 Thousands/µL                  CT abdomen pelvis with contrast   Final Result by Anusha Bentley MD (11/19 1452)      There is an abscess in the right perineal region  This appears to be below the pelvic floor musculature  The exact source is uncertain although based on clinical history might originate from the anal region  The predominant portion of the collection    seems to actually be in or near the right vaginal wall  Right renal cyst which appears to contain a small amount of calcium, dependently  Likely a benign finding  Cholecystectomy  Small bilobed appearing left ovary cyst       Workstation performed: [id number]                    Procedures  CriticalCare Time  Performed by: Erlinda Menjivar MD  Authorized by: Erlinda Menjivar MD     Critical care provider statement:     Critical care time (minutes):  33    Critical care time was exclusive of:  Separately billable procedures and treating other patients and teaching time    Critical care was necessary to treat or prevent imminent or life-threatening deterioration of the following conditions:  Circulatory failure    Critical care was time spent personally by me on the following activities:  Obtaining history from patient or surrogate, development of treatment plan with patient or surrogate, discussions with consultants, evaluation of patient's response to treatment, examination of patient, re-evaluation of patient's condition, ordering and review of radiographic studies, ordering and review of laboratory studies and ordering and performing treatments and interventions    I assumed direction of critical care for this patient from another provider in my specialty: no               ED Course                               SBIRT 22yo+    Flowsheet Row Most Recent Value   SBIRT (23 yo +)    In order to provide better care to our patients, we are screening all of our patients for alcohol and drug use   Would it be okay to ask you these screening questions? Yes Filed at: 11/19/2022 1128   Initial Alcohol Screen: US AUDIT-C     1  How often do you have a drink containing alcohol? 0 Filed at: 11/19/2022 1128   2  How many drinks containing alcohol do you have on a typical day you are drinking? 0 Filed at: 11/19/2022 1128   3b  FEMALE Any Age, or MALE 65+: How often do you have 4 or more drinks on one occassion? 0 Filed at: 11/19/2022 1128   Audit-C Score 0 Filed at: 11/19/2022 1128   RODNEY: How many times in the past year have you    Used an illegal drug or used a prescription medication for non-medical reasons? Never Filed at: 11/19/2022 1128                    MDM  Number of Diagnoses or Management Options  Gluteal abscess: new and requires workup  Leukocytosis: new and requires workup  Perineal abscess: new and requires workup  Diagnosis management comments: This is a 51-year-old female who presents here today with rectal pain  She says she started having mild symptoms on 11/15  She has been using warm water soaks, Sitz baths, trying to take laxatives and stool softeners without improvement  She says several years ago she had problems with a fistula and an infection secondary to constipation  She says she let it go so far that it popped and started draining on its own  She was seen for it and they drained it further and symptoms completely resolved  She has been taking "a lot" of ibuprofen to help with pain  She was constipated for about a week leading up to symptoms  She does have intermittent problems with mild constipation at baseline  She does endorse nausea which she attributes to pain but denies actual vomiting  She has no fevers or abdominal pain  She says she is now having difficulties sitting and is having pain with walking  She has never had a colonoscopy before  She denies known problems with IBD  She denies any blood in her stool or any drainage    She has a history of achalasia but denies any other medical problems  Review of systems:  Otherwise negative unless stated as above    She is well-appearing, in no acute distress  She has tenderness to the right gluteal region and an area that is firm to the touch, however has no raised area, no overlying erythema, induration, fluctuance  This scar from prior drainage is slightly lateral to this  There is no perirectal tenderness  Exam is otherwise unremarkable  Concern is that gluteal abscess is related to a fistula given preceding constipation and may be extending from higher up, making bedside I&D contraindicated  We will check lab work and CT scan to evaluate  Lab work shows minimal leukocytosis to 12, but is otherwise unremarkable  CT scan shows an abscess that extends from uncertain region, possibly ill, but possibly in or near the vaginal wall  I did discuss this with Dr Rafael Schuster from general surgery, whether this would be drained by him or might need gynecology assistance given location near the vagina  She was seen by General surgery, who plans to take her to the OR here  I did update the patient on findings and plan of care, and she expresses understanding  She initially did have improvement with pain medications and then had worsening of pain  Second dose of pain medications did not significantly help her pain, however she was taken to the operating room before additional pain medication could be given         Amount and/or Complexity of Data Reviewed  Clinical lab tests: ordered and reviewed  Tests in the radiology section of CPT®: reviewed and ordered  Independent visualization of images, tracings, or specimens: yes        Disposition  Final diagnoses:   Gluteal abscess   Perineal abscess   Leukocytosis     Time reflects when diagnosis was documented in both MDM as applicable and the Disposition within this note     Time User Action Codes Description Comment    11/19/2022  4:50 PM Jacqueline Dumont Add [K61 0] Perianal abscess     11/19/2022  4:57 PM Porsche Strange Add [L02 31] Gluteal abscess     11/19/2022  4:57 PM Porsche Strange Add [E30 638] Perineal abscess     11/19/2022  4:58 PM Porsche Strange Modify [K61 0] Perianal abscess     11/19/2022  4:58 PM Porsche Strange Modify [L02 31] Gluteal abscess     11/19/2022  4:58 PM Porsche Strange Add [C91 668] Leukocytosis     11/19/2022  6:14 PM Ples Groom Modify [K61 0] Perianal abscess       ED Disposition     ED Disposition   Send to OR    Condition   --    Date/Time   Sat Nov 19, 2022  4:57 PM    Comment   --         Follow-up Information     Follow up With Specialties Details Why Contact Info    Nicolle Rajput MD General Surgery Schedule an appointment as soon as possible for a visit in 2 week(s)  [address]  [facility] [address]  [place]  [phone]            Current Discharge Medication List      START taking these medications    Details   acetaminophen (TYLENOL) 325 mg tablet Take 2 tablets (650 mg total) by mouth every 6 (six) hours  Qty: 20 tablet, Refills: 0    Associated Diagnoses: Perineal abscess      amoxicillin-clavulanate (AUGMENTIN) 875-125 mg per tablet Take 1 tablet by mouth every 12 (twelve) hours for 5 days  Qty: 10 tablet, Refills: 0    Associated Diagnoses: Perineal abscess      oxyCODONE (ROXICODONE) 5 immediate release tablet Take 1 tablet (5 mg total) by mouth every 4 (four) hours as needed for moderate pain or severe pain for up to 3 days Max Daily Amount: 30 mg  Qty: 18 tablet, Refills: 0    Associated Diagnoses: Perineal abscess         CONTINUE these medications which have NOT CHANGED    Details   glucosamine-chondroitin 500-400 MG tablet Take 1 tablet by mouth daily              Outpatient Discharge Orders   Discharge Diet     Activity as tolerated     No strenuous exercise     Call provider for:  persistent nausea or vomiting     Call provider for:  severe uncontrolled pain     Call provider for: redness, tenderness, or signs of infection (pain, swelling, redness, odor or green/yellow discharge around incision site)     Call provider for: active or persistent bleeding     Remove dressing in 48 hours       PDMP Review     None          ED Provider  Electronically Signed by           Soraya Oliva MD  11/19/22 1917       Soraya Oliva MD  11/19/22 1918

## 2022-11-19 NOTE — H&P
History and physical- General Surgery   Nathan Purdy 52 y o  female MRN: [id number]  Unit/Bed#: ED 25 Encounter: [id number]    Assessment/Plan     Assessment:  Perirectal abscess  Morbid obesity  History of achalasia, status post Heller myotomy  Plan:  The patient was advised to undergo incision and drainage of perirectal abscess and open wound packing  I discussed the operative procedure, risks, benefits, alternatives and possible complications, she understood and agreed to proceed  History of Present Illness   HPI:  Nathan Purdy is a 52 y o  female who presents to the emergency room complaining of perirectal pain for the past 2 days, pain has been getting progressively worse, she is not able to sit down without having severe pain  She denied having any fever, chills, nausea, vomiting, diarrhea, constipation or any other constitutional symptoms  She has had similar episode a couple years ago which was treated conservatively, the abscess spontaneously drained  She did not have any follow-up and she never had colonoscopy in the past     Consults    Review of Systems: The rest of the review of system total of 10 were negative except for the HPI      Historical Information   Past Medical History:   Diagnosis Date   • Achalasia      Past Surgical History:   Procedure Laterality Date   •  SECTION     • CHOLECYSTECTOMY     • [place] LAP, ESOPHAGOMYOTOMY W FUNDOPLASTY N/A 2019    Procedure: LAPAROSCOPIC HELLER MYOTOMY AND PARTIAL FUNDOPLICATION, ALEX;  Surgeon: Lory Conner MD;  Location: [facility];  Service: Thoracic   • UPPER GASTROINTESTINAL ENDOSCOPY       Social History   Social History     Substance and Sexual Activity   Alcohol Use Not Currently     Social History     Substance and Sexual Activity   Drug Use Never     E-Cigarette/Vaping     E-Cigarette/Vaping Substances     Social History     Tobacco Use   Smoking Status Former   • Packs/day: 1 00   • Years: 10 00   • Pack years: 10 00 • Types: Cigarettes   • Quit date: 2016   • Years since quittin 5   Smokeless Tobacco Current     Family History: non-contributory    Meds/Allergies   all current active meds have been reviewed, current meds:   Current Facility-Administered Medications   Medication Dose Route Frequency   • heparin (porcine) subcutaneous injection 5,000 Units  5,000 Units Subcutaneous Q8H [address]   • lactated ringers infusion  125 mL/hr Intravenous Continuous   • morphine injection 2 mg  2 mg Intravenous Q2H PRN   • [START ON 2022] nicotine (NICODERM CQ) 7 mg/24hr TD 24 hr patch 1 patch  1 patch Transdermal Daily   • ondansetron (ZOFRAN) injection 4 mg  4 mg Intravenous Q6H PRN   • [START ON 2022] piperacillin-tazobactam (ZOSYN) 3 375 g in sodium chloride 0 9 % 100 mL IVPB  3 375 g Intravenous On Call To OR    and PTA meds:   Prior to Admission Medications   Prescriptions Last Dose Informant Patient Reported?  Taking?   glucosamine-chondroitin 500-400 MG tablet   Yes No   Sig: Take 1 tablet by mouth daily       Facility-Administered Medications: None     Allergies   Allergen Reactions   • Promethazine Other (See Comments)     Restless legs       Objective   First Vitals:   Blood Pressure: 163/85 (22 1115)  Pulse: 100 (22 1115)  Temperature: 98 1 °F (36 7 °C) (22 1115)  Temp Source: Tympanic (22 1115)  Respirations: 18 (22 1115)  Weight - Scale: 93 kg (205 lb) (22 1115)  SpO2: 99 % (22 1115)    Current Vitals:   Blood Pressure: 146/76 (22 1345)  Pulse: 71 (22 1345)  Temperature: 98 1 °F (36 7 °C) (22 1115)  Temp Source: Tympanic (22 1115)  Respirations: 20 (22 1345)  Weight - Scale: 93 kg (205 lb) (22 1115)  SpO2: 98 % (22 1345)    No intake or output data in the 24 hours ending 22 1653    Invasive Devices     Peripheral Intravenous Line  Duration           Peripheral IV 22 Right Antecubital <1 day                Physical Exam  Vitals and nursing note reviewed  Constitutional:       General: She is not in acute distress  Appearance: She is obese  Cardiovascular:      Rate and Rhythm: Normal rate and regular rhythm  Heart sounds: No murmur heard  Pulmonary:      Effort: No respiratory distress  Breath sounds: Normal breath sounds  Abdominal:      Palpations: Abdomen is soft  There is no mass  Tenderness: There is no abdominal tenderness  Genitourinary:     Comments: There is erythema with tenderness to palpation on the right perirectal area, there is no obvious fluctuation  No rectal examination was performed due to patient being very uncomfortable with pain  Skin:     General: Skin is warm  Coloration: Skin is not jaundiced  Findings: No erythema or rash  Neurological:      Mental Status: She is alert and oriented to person, place, and time  Cranial Nerves: No cranial nerve deficit  Psychiatric:         Mood and Affect: Mood normal          Behavior: Behavior normal          Lab Results:   I have personally reviewed pertinent lab results  , CBC:   Lab Results   Component Value Date    WBC 12 03 (H) 11/19/2022    HGB 13 1 11/19/2022    HCT 39 9 11/19/2022    MCV 96 11/19/2022     11/19/2022    MCH 31 3 11/19/2022    MCHC 32 8 11/19/2022    RDW 12 4 11/19/2022    MPV 10 4 11/19/2022    NRBC 0 11/19/2022   , CMP:   Lab Results   Component Value Date    SODIUM 141 11/19/2022    K 4 0 11/19/2022     11/19/2022    CO2 26 11/19/2022    BUN 10 11/19/2022    CREATININE 0 80 11/19/2022    CALCIUM 9 3 11/19/2022    EGFR 88 11/19/2022   , Coagulation: No results found for: PT, INR, APTT, Urinalysis: No results found for: Perico Roys, SPECGRAV, PHUR, LEUKOCYTESUR, NITRITE, PROTEINUA, GLUCOSEU, KETONESU, BILIRUBINUR, BLOODU, Amylase: No results found for: AMYLASE, Lipase: No results found for: LIPASE  Imaging: I have personally reviewed pertinent reports     and I have personally reviewed pertinent films in PACS   CT abdomen pelvis with contrast [[id number]] Collected: 11/19/22 1408   Order Status: Completed Updated: 11/19/22 1434   Narrative:     CT ABDOMEN AND PELVIS WITH IV CONTRAST     INDICATION:   recent constipation, gluteal pain, hx ?fistula  COMPARISON:  None  TECHNIQUE:  CT examination of the abdomen and pelvis was performed  Axial, sagittal, and coronal 2D reformatted images were created from the source data and submitted for interpretation  Radiation dose length product (DLP) for this visit:  991 mGy-cm    This examination, like all CT scans performed in the [facility], was performed utilizing techniques to minimize radiation dose exposure, including the use of iterative   reconstruction and automated exposure control  IV Contrast:  100 mL of iohexol (OMNIPAQUE)   Enteric Contrast:  Enteric contrast was not administered  FINDINGS:     ABDOMEN     LOWER CHEST:  Lung bases are clear   The distal esophagus is dilated and has air-fluid level within the lumen which could indicate reflux   Patient has reported history of achalasia   Therefore, this may indicate sequela of GE junction stricture   There   are surgical clips at the GE junction  LIVER/BILIARY TREE: Lunette Blake is pneumobilia   No focal liver mass lesion identified  GALLBLADDER: Myah Berryerel is surgically absent  SPLEEN:  Unremarkable  PANCREAS:  Unremarkable  ADRENAL GLANDS:  Unremarkable  KIDNEYS/URETERS:  At the midpole of the right kidney, laterally, there is a small cortical cyst which may have some internal dependent calcium   Otherwise, the kidneys and ureters are unremarkable  STOMACH AND BOWEL:  No bowel obstruction identified  APPENDIX:  No findings to suggest appendicitis  ABDOMINOPELVIC CAVITY:  No ascites   No pneumoperitoneum   No lymphadenopathy  VESSELS:  Unremarkable for patient's age       PELVIS     REPRODUCTIVE ORGANS:  The uterus is unremarkable  Virgia [place] is a bilobed appearing cystic structure in the left ovary which is 2 0 x 2 5 cm   The right ovary is unremarkable  In the perineal area to the right of midline there is a slightly peripherally enhanced centrally low density mixed fluid and gas collection structure most compatible with an abscess   This is 4 0 x 2 5 cm on image 88 series 2 and is difficult to   precisely localize but seems to be perhaps along the lateral margin of the vaginal wall just ventral to the anus   It seems to continue more posteriorly as a curvilinear tubular fluid density structure within the medial aspect of the right buttocks in   the subcutaneous fat   That area is not fully included within the field-of-view, but is on the order of 3 5 x 2 0 cm   Unfortunately, it is difficult to determine the exact source for this collection   The provided clinical history would suggest that it   might be related to the anus  It does not appear to extend into the pelvis itself  URINARY BLADDER:  Unremarkable  ABDOMINAL WALL/INGUINAL REGIONS:  Minimally prominent bilateral inguinal lymph nodes are noted  Luca Alar may be benign reactive nodes  OSSEOUS STRUCTURES:  No acute fracture or destructive osseous lesion  Impression:       There is an abscess in the right perineal region   This appears to be below the pelvic floor musculature   The exact source is uncertain although based on clinical history might originate from the anal region   The predominant portion of the collection   seems to actually be in or near the right vaginal wall  Right renal cyst which appears to contain a small amount of calcium, dependently  Jerrlyn Patches a benign finding  Cholecystectomy       Small bilobed appearing left ovary cyst

## 2022-11-19 NOTE — OP NOTE
OPERATIVE REPORT  PATIENT NAME: Oliver Farias    :  1975  MRN: [id number]  Pt Location: [facility]OM 02    SURGERY DATE: 2022    Surgeon(s) and Role:     * Tyler Kitchen MD - Primary     * Darral Nissen, PA-C - Assisting    Preop Diagnosis:  Perianal abscess [K61 0]    Post-Op Diagnosis Codes:     * Perianal abscess [K61 0]    Procedure(s) (LRB):  INCISION AND DRAINAGE (I&D) PERINEAL (N/A)    Specimen(s):  ID Type Source Tests Collected by Time Destination   A : perirectal abscess Tissue Wound ANAEROBIC CULTURE AND GRAM STAIN, CULTURE, TISSUE AND GRAM STAIN Tyler Kitchen MD 2022 1813        Estimated Blood Loss:   Minimal    Drains:  None    Anesthesia Type:   General    Operative Indications:  Perianal abscess [K61 0]    Operative Findings:  The patient had perirectal abscess with a fistula going towards the major labia on the right side  The size of the abscess was 4 cm in the perirectal area and 4 cm in the right major labia  Complications:   None    Procedure and Technique:  The patient was identified in the patient was placed in the operating table in a supine position  After adequate anesthesia induction and satisfactory LMA intubation she was repositioned in lithotomy  The perineal and perirectal area were prepped and draped in sterile usual fashion with Betadine solution  Time-out was called the patient was identified as was surgical site  There was a fluctuating area on the right perirectal area measure approximately 4 cm with erythema, an incision was made in this area, large amounts of foul-smelling pus was identified, cultures were taken for aerobic and anaerobic  An additional bulge in fluctuation on the right major labia was noted, hemostat was placed through the previous incision in a tract was noted to go all the way up to this area  Subsequently a counter incision was made on the major labia in the hemostat was visualized    Large amounts of pus was noted in this area   The trach between the labia and the perirectal abscess was marsupialized by removing the overlying skin for approximately 1 cm  Hemostasis was accomplished with cautery  The wound was packed open with saline and gauze  Sterile dressing was applied  At the end of case instrument, needles, sponges counts were correct  Patient tolerated the procedure well         I was present for the entire procedure, A qualified resident physician was not available and A physician assistant was required during the procedure for retraction tissue handling,dissection and suturing    Patient Disposition:  PACU , hemodynamically stable and extubated and stable        SIGNATURE: Abran Alexandra MD  DATE: November 19, 2022  TIME: 6:45 PM

## 2022-11-20 NOTE — DISCHARGE INSTRUCTIONS
Wound Care: The wound is dressed with packing inside the incision and additional dressing on top of the packing to absorb drainage  You may change or reinforce the overlying dressing as needed if drainage is heavy  Please leave the packing in place for a total of 48 hours  You may remove the packing two days after surgery (Monday, 11/21/22)  - Recommend removing the packing while in the shower  If you were given a prescription for pain medication, you may take a dose prior to removal  Allow warm water to soak and soften the packing, and then remove as tolerated  There should only be one, continuous strip of packing in place   - After packing removal, dry area and place dry gauze/dressing over area of surgical site  You do NOT need to repack the wound  - Change the overlying dressing daily or more frequently if needed  - You may shower daily and get the wound to get wet  Do not scrub at the wound, and gently pat dry afterward  Place clean dressing on top    - Follow up with the surgeon in two weeks for wound check  Please call the office sooner if you develop worsening pain or swelling, develop fevers, or notice increased discharge from the wound        - after packing is removed on Monday, Do sitz baths twice daily and after bowel movements  - Complete the antibiotics prescribed

## 2022-11-22 LAB
BACTERIA SPEC ANAEROBE CULT: ABNORMAL
BACTERIA SPEC ANAEROBE CULT: ABNORMAL
BACTERIA TISS AEROBE CULT: ABNORMAL
BACTERIA TISS AEROBE CULT: ABNORMAL
GRAM STN SPEC: ABNORMAL

## 2022-12-02 ENCOUNTER — OFFICE VISIT (OUTPATIENT)
Dept: SURGERY | Facility: CLINIC | Age: 47
End: 2022-12-02

## 2022-12-02 VITALS
HEIGHT: 62 IN | SYSTOLIC BLOOD PRESSURE: 124 MMHG | OXYGEN SATURATION: 98 % | HEART RATE: 72 BPM | TEMPERATURE: 97.6 F | BODY MASS INDEX: 38.02 KG/M2 | WEIGHT: 206.6 LBS | DIASTOLIC BLOOD PRESSURE: 78 MMHG

## 2022-12-02 DIAGNOSIS — Z48.89 POSTOPERATIVE VISIT: Primary | ICD-10-CM

## 2022-12-02 RX ORDER — LEVOCETIRIZINE DIHYDROCHLORIDE 5 MG/1
TABLET, FILM COATED ORAL
COMMUNITY
Start: 2022-09-20

## 2022-12-02 RX ORDER — LEVOCETIRIZINE DIHYDROCHLORIDE 5 MG/1
5 TABLET, FILM COATED ORAL
COMMUNITY
Start: 2022-03-15 | End: 2023-03-15

## 2022-12-02 RX ORDER — LANOLIN ALCOHOL/MO/W.PET/CERES
1 CREAM (GRAM) TOPICAL 3 TIMES DAILY PRN
COMMUNITY

## 2022-12-02 NOTE — PROGRESS NOTES
Post-Op Follow Up- General Surgery   Tushar Chen 52 y o  female MRN: [id number]  Unit/Bed#:  Encounter: [id number]    Assessment/Plan     Assessment:  Status post incision and drainage of perirectal abscess, extended to the right labia, improving  Plan:  Patient will continue with the daily dressing changes and Sitz bath twice a day  She will return to my office in 2 weeks for follow-up  History of Present Illness     HPI:  Tushar Chen is a 52 y o  female who presents to my office for 1st postop follow-up after incision and drainage of perirectal abscess which extended to the right labia from   She offers no complaints at this time  She still has some drainage but it has improved          Historical Information   Past Medical History:   Diagnosis Date   • Achalasia      Past Surgical History:   Procedure Laterality Date   •  SECTION     • CHOLECYSTECTOMY     • [place] I&D OF VULVA/PERINEUM ABSCESS N/A 2022    Procedure: INCISION AND DRAINAGE (I&D) PERINEAL;  Surgeon: Kelle Suresh MD;  Location: [facility];  Service: General   • [place] LAP, ESOPHAGOMYOTOMY W FUNDOPLASTY N/A 2019    Procedure: LAPAROSCOPIC HELLER MYOTOMY AND PARTIAL FUNDOPLICATION, ALEX;  Surgeon: Hari Sanchez MD;  Location:  MAIN OR;  Service: Thoracic   • UPPER GASTROINTESTINAL ENDOSCOPY       Social History   Social History     Substance and Sexual Activity   Alcohol Use Not Currently     Social History     Substance and Sexual Activity   Drug Use Never     Social History     Tobacco Use   Smoking Status Former   • Packs/day: 1 00   • Years: 10 00   • Pack years: 10 00   • Types: Cigarettes   • Quit date: 2016   • Years since quittin 6   Smokeless Tobacco Never     Family History: non-contributory    Meds/Allergies   all medications and allergies reviewed     Current Outpatient Medications:   •  glucosamine-chondroitin 500-400 MG tablet, Take 1 tablet by mouth daily , Disp: , Rfl:   • glucosamine-chondroitin 500-400 MG tablet, Take 1 tablet by mouth Three times daily as needed, Disp: , Rfl:   •  levocetirizine (XYZAL) 5 MG tablet, Take 5 mg by mouth, Disp: , Rfl:   •  levocetirizine (XYZAL) 5 MG tablet, , Disp: , Rfl:   •  acetaminophen (TYLENOL) 325 mg tablet, Take 2 tablets (650 mg total) by mouth every 6 (six) hours (Patient not taking: Reported on 12/2/2022), Disp: 20 tablet, Rfl: 0  Allergies   Allergen Reactions   • Promethazine Other (See Comments)     Restless legs       Objective     Current Vitals:   Blood Pressure: 124/78 (12/02/22 0906)  Pulse: 72 (12/02/22 0906)  Temperature: 97 6 °F (36 4 °C) (12/02/22 0906)  Height: 5' 2" (157 5 cm) (12/02/22 0906)  Weight - Scale: 93 7 kg (206 lb 9 6 oz) (12/02/22 0906)  SpO2: 98 % (12/02/22 0906)    Physical Exam  Vitals and nursing note reviewed  Genitourinary:     Comments: No physical examination was performed at this time

## 2022-12-15 ENCOUNTER — OFFICE VISIT (OUTPATIENT)
Dept: SURGERY | Facility: CLINIC | Age: 47
End: 2022-12-15

## 2022-12-15 VITALS
DIASTOLIC BLOOD PRESSURE: 74 MMHG | HEIGHT: 62 IN | BODY MASS INDEX: 38.28 KG/M2 | OXYGEN SATURATION: 99 % | WEIGHT: 208 LBS | SYSTOLIC BLOOD PRESSURE: 106 MMHG | HEART RATE: 64 BPM

## 2022-12-15 DIAGNOSIS — Z48.89 POSTOPERATIVE VISIT: Primary | ICD-10-CM

## 2022-12-15 NOTE — PROGRESS NOTES
Post-Op Follow Up- General Surgery   Lily Chaudhary 52 y o  female MRN: [id number]  Unit/Bed#:  Encounter: [id number]    Assessment/Plan     Assessment:  Status post incision and drainage of perirectal abscess, extended to the right labia, improved  Plan:  Patient is doing quite well from the surgical standpoint  Wound is almost closed  Minimal drainage  No further work-up or intervention is needed from surgical standpoint, patient is discharged from our care  Patient was courage to see her GYN for further issues  I believe the she did not have a perirectal abscess, most likely Nabothian cyst that extended towards the rectum  History of Present Illness     HPI:  Lily Chaudhary is a 52 y o  female who presents to my office for second postop follow-up after incision and drainage of right buttock abscess that extended to the right labia  Patient is offers no complaints at this time  Minimal drainage        Historical Information   Past Medical History:   Diagnosis Date   • Achalasia    • Obesity      Past Surgical History:   Procedure Laterality Date   •  SECTION     • CHOLECYSTECTOMY     • ID I&D OF VULVA/PERINEUM ABSCESS N/A 2022    Procedure: INCISION AND DRAINAGE (I&D) PERINEAL;  Surgeon: Carline Casey MD;  Location: [facility];  Service: General   • ID LAP, ESOPHAGOMYOTOMY W FUNDOPLASTY N/A 2019    Procedure: LAPAROSCOPIC HELLER MYOTOMY AND PARTIAL FUNDOPLICATION, ALEX;  Surgeon: Austyn Wilkinson MD;  Location:  MAIN OR;  Service: Thoracic   • UPPER GASTROINTESTINAL ENDOSCOPY       Social History   Social History     Substance and Sexual Activity   Alcohol Use Not Currently     Social History     Substance and Sexual Activity   Drug Use Never     Social History     Tobacco Use   Smoking Status Former   • Packs/day: 1 00   • Years: 10 00   • Pack years: 10 00   • Types: Cigarettes   • Quit date: 2016   • Years since quittin 3   Smokeless Tobacco Never     Family History: non-contributory    Meds/Allergies   all medications and allergies reviewed     Current Outpatient Medications:   •  glucosamine-chondroitin 500-400 MG tablet, Take 1 tablet by mouth Three times daily as needed, Disp: , Rfl:   •  levocetirizine (XYZAL) 5 MG tablet, , Disp: , Rfl:   •  acetaminophen (TYLENOL) 325 mg tablet, Take 2 tablets (650 mg total) by mouth every 6 (six) hours (Patient not taking: Reported on 12/2/2022), Disp: 20 tablet, Rfl: 0  •  glucosamine-chondroitin 500-400 MG tablet, Take 1 tablet by mouth daily  (Patient not taking: Reported on 12/15/2022), Disp: , Rfl:   •  levocetirizine (XYZAL) 5 MG tablet, Take 5 mg by mouth (Patient not taking: Reported on 12/15/2022), Disp: , Rfl:   Allergies   Allergen Reactions   • Promethazine Other (See Comments)     Restless legs       Objective     Current Vitals:   Blood Pressure: 106/74 (12/15/22 0821)  Pulse: 64 (12/15/22 0821)  Height: 5' 2" (157 5 cm) (12/15/22 0821)  Weight - Scale: 94 3 kg (208 lb) (12/15/22 0821)  SpO2: 99 % (12/15/22 6055)    Physical Exam  Vitals and nursing note reviewed  Genitourinary:     Comments: Incision from the right buttock and right labia are almost healed    Minimal drainage without evidence of infection or residual abscess

## 2023-10-20 ENCOUNTER — TELEPHONE (OUTPATIENT)
Dept: ADMINISTRATIVE | Facility: OTHER | Age: 48
End: 2023-10-20

## 2023-10-20 ENCOUNTER — OFFICE VISIT (OUTPATIENT)
Dept: FAMILY MEDICINE CLINIC | Facility: CLINIC | Age: 48
End: 2023-10-20
Payer: COMMERCIAL

## 2023-10-20 VITALS
HEIGHT: 62 IN | WEIGHT: 209 LBS | TEMPERATURE: 99.2 F | HEART RATE: 78 BPM | BODY MASS INDEX: 38.46 KG/M2 | DIASTOLIC BLOOD PRESSURE: 76 MMHG | SYSTOLIC BLOOD PRESSURE: 118 MMHG | OXYGEN SATURATION: 98 %

## 2023-10-20 DIAGNOSIS — F99 INSOMNIA DUE TO OTHER MENTAL DISORDER: ICD-10-CM

## 2023-10-20 DIAGNOSIS — G89.29 CHRONIC PAIN OF RIGHT KNEE: ICD-10-CM

## 2023-10-20 DIAGNOSIS — Z00.00 ANNUAL PHYSICAL EXAM: Primary | ICD-10-CM

## 2023-10-20 DIAGNOSIS — M25.561 CHRONIC PAIN OF RIGHT KNEE: ICD-10-CM

## 2023-10-20 DIAGNOSIS — F33.1 MODERATE EPISODE OF RECURRENT MAJOR DEPRESSIVE DISORDER (HCC): ICD-10-CM

## 2023-10-20 DIAGNOSIS — F51.05 INSOMNIA DUE TO OTHER MENTAL DISORDER: ICD-10-CM

## 2023-10-20 PROCEDURE — 99204 OFFICE O/P NEW MOD 45 MIN: CPT | Performed by: FAMILY MEDICINE

## 2023-10-20 PROCEDURE — 99396 PREV VISIT EST AGE 40-64: CPT | Performed by: FAMILY MEDICINE

## 2023-10-20 RX ORDER — TRAZODONE HYDROCHLORIDE 50 MG/1
50 TABLET ORAL
Qty: 30 TABLET | Refills: 2 | Status: SHIPPED | OUTPATIENT
Start: 2023-10-20

## 2023-10-20 RX ORDER — MELOXICAM 15 MG/1
15 TABLET ORAL DAILY
COMMUNITY
Start: 2023-02-17 | End: 2023-10-20 | Stop reason: SDUPTHER

## 2023-10-20 RX ORDER — MELOXICAM 15 MG/1
15 TABLET ORAL DAILY
Qty: 30 TABLET | Refills: 0 | Status: SHIPPED | OUTPATIENT
Start: 2023-10-20

## 2023-10-20 NOTE — PROGRESS NOTES
[address]    NAME: Malaika Eddy  AGE: 50 y.o. SEX: female  : 1975     DATE: 10/20/2023     Assessment and Plan:     Problem List Items Addressed This Visit    None  Visit Diagnoses     Annual physical exam    -  Primary    Relevant Orders    CBC and differential    Lipid panel    Comprehensive metabolic panel    BMI 87.4-96.2,JBQSM        Relevant Orders    CBC and differential    Lipid panel    Comprehensive metabolic panel    Insomnia due to other mental disorder        Relevant Medications    traZODone (DESYREL) 50 mg tablet    Moderate episode of recurrent major depressive disorder (HCC)        Relevant Medications    traZODone (DESYREL) 50 mg tablet    Chronic pain of right knee        Relevant Medications    meloxicam (MOBIC) 15 mg tablet    Other Relevant Orders    Ambulatory Referral to Sports Medicine        Immunizations and preventive care screenings were discussed with patient today. Appropriate education was printed on patient's after visit summary. Counseling:  Alcohol/drug use: discussed moderation in alcohol intake, the recommendations for healthy alcohol use, and avoidance of illicit drug use. Dental Health: discussed importance of regular tooth brushing, flossing, and dental visits. Sexual health: discussed sexually transmitted diseases, partner selection, use of condoms, avoidance of unintended pregnancy, and contraceptive alternatives. Exercise: the importance of regular exercise/physical activity was discussed. Recommend exercise 3-5 times per week for at least 30 minutes. Depression Screening and Follow-up Plan: Patient's depression screening was positive with a PHQ-2 score of 3. Their PHQ-9 score was 10. Return in about 6 weeks (around 2023) for f/u mental health, sleep.      Chief Complaint:     Chief Complaint   Patient presents with   • Physical Exam   • Establish Care   • Knee Pain     Right knee pain onset since 2023. Pain come and goes. • Insomnia      History of Present Illness:     Adult Annual Physical   Patient here for a comprehensive physical exam. The patient reports problems - as documented below . Patient reports right knee pain. States that this has been present on and off since January. States that she has been using Meloxicam PRN. Went to ortho and urgent care, did PT and the knee pain improved. States that she did one day of yard work, knee pain returned. Notes that she is having popping and cracking of the knee. Notes that she has a hard time going up and down the stairs. Patient states that she has issues with insomnia, see discussion below. Diet and Physical Activity  Diet/Nutrition: well balanced diet. Exercise: walking and 2-3 times per week . Depression Screening  PHQ-2/9 Depression Screening    Little interest or pleasure in doing things: 2 - more than half the days  Feeling down, depressed, or hopeless: 1 - several days  Trouble falling or staying asleep, or sleeping too much: 3 - nearly every day  Feeling tired or having little energy: 2 - more than half the days  Poor appetite or overeatin - not at all  Feeling bad about yourself - or that you are a failure or have let yourself or your family down: 1 - several days  Trouble concentrating on things, such as reading the newspaper or watching television: 0 - not at all  Moving or speaking so slowly that other people could have noticed. Or the opposite - being so fidgety or restless that you have been moving around a lot more than usual: 1 - several days  Thoughts that you would be better off dead, or of hurting yourself in some way: 0 - not at all  PHQ-2 Score: 3  PHQ-2 Interpretation: POSITIVE depression screen  PHQ-9 Score: 10   PHQ-9 Interpretation: Moderate depression        Seeing a therapist for talk therapy.       General Health  Sleep: sleeps poorly and gets 7-8 hours of sleep on average. Notes that this is broken sleep unless she takes benadryl. Does not snore. Hearing: normal - bilateral.  Vision: goes for regular eye exams, most recent eye exam <1 year ago, and wears glasses. Dental: regular dental visits and has full uppers and lowers . /GYN Health  Patient is: premenopausal  Last menstrual period: 10/5/23  Contraceptive method:  tubal .    Advanced Care Planning  Do you have an advanced directive? no  Do you have a durable medical power of ?  no     Review of Systems:     Review of Systems   Past Medical History:     Past Medical History:   Diagnosis Date   • Achalasia 2006   • Obesity       Past Surgical History:     Past Surgical History:   Procedure Laterality Date   •  SECTION     • CHOLECYSTECTOMY     • [place] I&D VULVA/PERINEAL ABSCESS N/A 2022    Procedure: INCISION AND DRAINAGE (I&D) PERINEAL;  Surgeon: Michael Garcia MD;  Location: [facility];  Service: General   • [place] LAPS ESOPHAGOMYOTOMY W/FUNDOPLASTY IF PERFORMED N/A 2019    Procedure: LAPAROSCOPIC HELLER MYOTOMY AND PARTIAL FUNDOPLICATION, ALEX;  Surgeon: Jonathan Mendez MD;  Location:  MAIN OR;  Service: Thoracic   • UPPER GASTROINTESTINAL ENDOSCOPY        Social History:     Social History     Socioeconomic History   • Marital status: /Civil Union     Spouse name: None   • Number of children: None   • Years of education: None   • Highest education level: None   Occupational History   • None   Tobacco Use   • Smoking status: Former     Packs/day: 1.00     Years: 10.00     Total pack years: 10.00     Types: Cigarettes     Start date: 18     Quit date: 2016     Years since quittin.1   • Smokeless tobacco: Never   Vaping Use   • Vaping Use: Never used   Substance and Sexual Activity   • Alcohol use: Not Currently   • Drug use: Never   • Sexual activity: Yes     Partners: Male     Birth control/protection: Female Sterilization   Other Topics Concern   • None   Social History Narrative   • None     Social Determinants of Health     Financial Resource Strain: Not on file   Food Insecurity: Not on file   Transportation Needs: Not on file   Physical Activity: Not on file   Stress: Not on file   Social Connections: Not on file   Intimate Partner Violence: Not on file   Housing Stability: Not on file      Family History:     Family History   Problem Relation Age of Onset   • Asthma Mother    • Hypertension Mother    • Diabetes Mother    • Hyperlipidemia Mother    • Cancer Father       Current Medications:     Current Outpatient Medications   Medication Sig Dispense Refill   • meloxicam (MOBIC) 15 mg tablet Take 1 tablet (15 mg total) by mouth daily 30 tablet 0   • traZODone (DESYREL) 50 mg tablet Take 1 tablet (50 mg total) by mouth daily at bedtime 30 tablet 2     No current facility-administered medications for this visit. Allergies: Allergies   Allergen Reactions   • Promethazine Other (See Comments)     Restless legs      Physical Exam:     /76 (BP Location: Left arm, Patient Position: Sitting, Cuff Size: Standard)   Pulse 78   Temp 99.2 °F (37.3 °C)   Ht 5' 2" (1.575 m)   Wt 94.8 kg (209 lb)   SpO2 98%   BMI 38.23 kg/m²     Physical Exam  Vitals reviewed. Constitutional:       General: She is not in acute distress. Appearance: Normal appearance. HENT:      Head: Normocephalic and atraumatic. Right Ear: External ear normal.      Left Ear: External ear normal.      Nose: Nose normal.      Mouth/Throat:      Mouth: Mucous membranes are moist.   Eyes:      Extraocular Movements: Extraocular movements intact. Conjunctiva/sclera: Conjunctivae normal.      Pupils: Pupils are equal, round, and reactive to light. Cardiovascular:      Rate and Rhythm: Normal rate and regular rhythm. Heart sounds: Normal heart sounds. Pulmonary:      Effort: Pulmonary effort is normal.      Breath sounds: Normal breath sounds. Abdominal:      General: Bowel sounds are normal. There is no distension. Palpations: Abdomen is soft. Tenderness: There is no abdominal tenderness. Musculoskeletal:      Cervical back: Neck supple. Right lower leg: No edema. Left lower leg: No edema. Lymphadenopathy:      Cervical: No cervical adenopathy. Skin:     General: Skin is warm. Capillary Refill: Capillary refill takes less than 2 seconds. Findings: No rash. Neurological:      Mental Status: She is alert. Mental status is at baseline.         Chandu Singer DO  [address]  [address] [place] [address]

## 2023-10-20 NOTE — TELEPHONE ENCOUNTER
----- Message from Jaquelin Howard sent at 10/20/2023  8:27 AM EDT -----  Regarding: care gap request Mammogram  10/20/23 8:27 AM    Hello, our patient attached above has had Mammogram completed/performed. Please assist in updating the patient chart by pulling the Care Everywhere (CE) document. The date of service is 12/31/2022.      Thank you,  Jaquelin Howard   [address] [facility]

## 2023-10-20 NOTE — PATIENT INSTRUCTIONS
Wellness Visit for Adults   AMBULATORY CARE:   A wellness visit  is when you see your healthcare provider to get screened for health problems. Your healthcare provider will also give you advice on how to stay healthy. Write down your questions so you remember to ask them. Ask your healthcare provider how often you should have a wellness visit. What happens at a wellness visit:  Your healthcare provider will ask about your health, and your family history of health problems. This includes high blood pressure, heart disease, and cancer. He or she will ask if you have symptoms that concern you, if you smoke, and about your mood. You may also be asked about your intake of medicines, supplements, food, and alcohol. Any of the following may be done: Your weight  will be checked. Your height may also be checked so your body mass index (BMI) can be calculated. Your BMI shows if you are at a healthy weight. Your blood pressure  and heart rate will be checked. Your temperature may also be checked. Blood and urine tests  may be done. Blood tests may be done to check your cholesterol levels. Abnormal cholesterol levels increase your risk for heart disease and stroke. You may also need a blood or urine test to check for diabetes if you are at increased risk. Urine tests may be done to look for signs of an infection or kidney disease. A physical exam  includes checking your heartbeat and lungs with a stethoscope. Your healthcare provider may also check your skin to look for sun damage. Screening tests  may be recommended. A screening test is done to check for diseases that may not cause symptoms. The screening tests you may need depend on your age, gender, family history, and lifestyle habits. For example, colorectal screening may be recommended if you are 48years old or older. Screening tests you need if you are a woman:   A Pap smear  is used to screen for cervical cancer.  Pap smears are usually done every 3 to 5 years depending on your age. You may need them more often if you have had abnormal Pap smear test results in the past. Ask your healthcare provider how often you should have a Pap smear. A mammogram  is an x-ray of your breasts to screen for breast cancer. Experts recommend mammograms every 2 years starting at age 48 years. You may need a mammogram at age 52 years or younger if you have an increased risk for breast cancer. Talk to your healthcare provider about when you should start having mammograms and how often you need them. Vaccines you may need:   Get an influenza vaccine  every year. The influenza vaccine protects you from the flu. Several types of viruses cause the flu. The viruses change over time, so new vaccines are made each year. Get a tetanus-diphtheria (Td) booster vaccine  every 10 years. This vaccine protects you against tetanus and diphtheria. Tetanus is a severe infection that may cause painful muscle spasms and lockjaw. Diphtheria is a severe bacterial infection that causes a thick covering in the back of your mouth and throat. Get a human papillomavirus (HPV) vaccine  if you are female and aged 23 to 32 or male 23 to 24 and never received it. This vaccine protects you from HPV infection. HPV is the most common infection spread by sexual contact. HPV may also cause vaginal, penile, and anal cancers. Get a pneumococcal vaccine  if you are aged 72 years or older. The pneumococcal vaccine is an injection given to protect you from pneumococcal disease. Pneumococcal disease is an infection caused by pneumococcal bacteria. The infection may cause pneumonia, meningitis, or an ear infection. Get a shingles vaccine  if you are 60 or older, even if you have had shingles before. The shingles vaccine is an injection to protect you from the varicella-zoster virus. This is the same virus that causes chickenpox.  Shingles is a painful rash that develops in people who had chickenpox or have been exposed to the virus. How to eat healthy:  My Plate is a model for planning healthy meals. It shows the types and amounts of foods that should go on your plate. Fruits and vegetables make up about half of your plate, and grains and protein make up the other half. A serving of dairy is included on the side of your plate. The amount of calories and serving sizes you need depends on your age, gender, weight, and height. Examples of healthy foods are listed below:  Eat a variety of vegetables  such as dark green, red, and orange vegetables. You can also include canned vegetables low in sodium (salt) and frozen vegetables without added butter or sauces. Eat a variety of fresh fruits , canned fruit in 100% juice, frozen fruit, and dried fruit. Include whole grains. At least half of the grains you eat should be whole grains. Examples include whole-wheat bread, wheat pasta, brown rice, and whole-grain cereals such as oatmeal.    Eat a variety of protein foods such as seafood (fish and shellfish), lean meat, and poultry without skin (turkey and chicken). Examples of lean meats include pork leg, shoulder, or tenderloin, and beef round, sirloin, tenderloin, and extra lean ground beef. Other protein foods include eggs and egg substitutes, beans, peas, soy products, nuts, and seeds. Choose low-fat dairy products such as skim or 1% milk or low-fat yogurt, cheese, and cottage cheese. Limit unhealthy fats  such as butter, hard margarine, and shortening. Exercise:  Exercise at least 30 minutes per day on most days of the week. Some examples of exercise include walking, biking, dancing, and swimming. You can also fit in more physical activity by taking the stairs instead of the elevator or parking farther away from stores. Include muscle strengthening activities 2 days each week. Regular exercise provides many health benefits.  It helps you manage your weight, and decreases your risk for type 2 diabetes, heart disease, stroke, and high blood pressure. Exercise can also help improve your mood. Ask your healthcare provider about the best exercise plan for you. General health and safety guidelines:   Do not smoke. Nicotine and other chemicals in cigarettes and cigars can cause lung damage. Ask your healthcare provider for information if you currently smoke and need help to quit. E-cigarettes or smokeless tobacco still contain nicotine. Talk to your healthcare provider before you use these products. Limit alcohol. A drink of alcohol is 12 ounces of beer, 5 ounces of wine, or 1½ ounces of liquor. Lose weight, if needed. Being overweight increases your risk of certain health conditions. These include heart disease, high blood pressure, type 2 diabetes, and certain types of cancer. Protect your skin. Do not sunbathe or use tanning beds. Use sunscreen with a SPF 15 or higher. Apply sunscreen at least 15 minutes before you go outside. Reapply sunscreen every 2 hours. Wear protective clothing, hats, and sunglasses when you are outside. Drive safely. Always wear your seatbelt. Make sure everyone in your car wears a seatbelt. A seatbelt can save your life if you are in an accident. Do not use your cell phone when you are driving. This could distract you and cause an accident. Pull over if you need to make a call or send a text message. Practice safe sex. Use latex condoms if are sexually active and have more than one partner. Your healthcare provider may recommend screening tests for sexually transmitted infections (STIs). Wear helmets, lifejackets, and protective gear. Always wear a helmet when you ride a bike or motorcycle, go skiing, or play sports that could cause a head injury. Wear protective equipment when you play sports. Wear a lifejacket when you are on a boat or doing water sports.     © Copyright Yenn[employer] 2023 Information is for End User's use only and may not be sold, redistributed or otherwise used for commercial purposes. The above information is an  only. It is not intended as medical advice for individual conditions or treatments. Talk to your doctor, nurse or pharmacist before following any medical regimen to see if it is safe and effective for you.

## 2023-10-25 NOTE — TELEPHONE ENCOUNTER
Upon review of the In Basket request we were able to locate, review, and update the patient chart as requested for Mammogram.    Any additional questions or concerns should be emailed to the Practice Liaisons via the appropriate education email address, please do not reply via In Basket.     Thank you  Vance Spar

## 2023-10-27 ENCOUNTER — APPOINTMENT (OUTPATIENT)
Age: 48
End: 2023-10-27
Payer: COMMERCIAL

## 2023-10-27 DIAGNOSIS — Z00.00 ANNUAL PHYSICAL EXAM: ICD-10-CM

## 2023-10-27 LAB
ALBUMIN SERPL BCP-MCNC: 3.9 G/DL (ref 3.5–5)
ALP SERPL-CCNC: 31 U/L (ref 34–104)
ALT SERPL W P-5'-P-CCNC: 9 U/L (ref 7–52)
ANION GAP SERPL CALCULATED.3IONS-SCNC: 7 MMOL/L
AST SERPL W P-5'-P-CCNC: 11 U/L (ref 13–39)
BASOPHILS # BLD AUTO: 0.05 THOUSANDS/ÂΜL (ref 0–0.1)
BASOPHILS NFR BLD AUTO: 1 % (ref 0–1)
BILIRUB SERPL-MCNC: 0.36 MG/DL (ref 0.2–1)
BUN SERPL-MCNC: 12 MG/DL (ref 5–25)
CALCIUM SERPL-MCNC: 9 MG/DL (ref 8.4–10.2)
CHLORIDE SERPL-SCNC: 106 MMOL/L (ref 96–108)
CHOLEST SERPL-MCNC: 198 MG/DL
CO2 SERPL-SCNC: 28 MMOL/L (ref 21–32)
CREAT SERPL-MCNC: 0.73 MG/DL (ref 0.6–1.3)
EOSINOPHIL # BLD AUTO: 0.12 THOUSAND/ÂΜL (ref 0–0.61)
EOSINOPHIL NFR BLD AUTO: 2 % (ref 0–6)
ERYTHROCYTE [DISTWIDTH] IN BLOOD BY AUTOMATED COUNT: 12.7 % (ref 11.6–15.1)
GFR SERPL CREATININE-BSD FRML MDRD: 97 ML/MIN/1.73SQ M
GLUCOSE P FAST SERPL-MCNC: 89 MG/DL (ref 65–99)
HCT VFR BLD AUTO: 41.6 % (ref 34.8–46.1)
HDLC SERPL-MCNC: 57 MG/DL
HGB BLD-MCNC: 13.2 G/DL (ref 11.5–15.4)
IMM GRANULOCYTES # BLD AUTO: 0.01 THOUSAND/UL (ref 0–0.2)
IMM GRANULOCYTES NFR BLD AUTO: 0 % (ref 0–2)
LDLC SERPL CALC-MCNC: 124 MG/DL (ref 0–100)
LYMPHOCYTES # BLD AUTO: 1.49 THOUSANDS/ÂΜL (ref 0.6–4.47)
LYMPHOCYTES NFR BLD AUTO: 26 % (ref 14–44)
MCH RBC QN AUTO: 31.4 PG (ref 26.8–34.3)
MCHC RBC AUTO-ENTMCNC: 31.7 G/DL (ref 31.4–37.4)
MCV RBC AUTO: 99 FL (ref 82–98)
MONOCYTES # BLD AUTO: 0.48 THOUSAND/ÂΜL (ref 0.17–1.22)
MONOCYTES NFR BLD AUTO: 8 % (ref 4–12)
NEUTROPHILS # BLD AUTO: 3.56 THOUSANDS/ÂΜL (ref 1.85–7.62)
NEUTS SEG NFR BLD AUTO: 63 % (ref 43–75)
NONHDLC SERPL-MCNC: 141 MG/DL
NRBC BLD AUTO-RTO: 0 /100 WBCS
PLATELET # BLD AUTO: 293 THOUSANDS/UL (ref 149–390)
PMV BLD AUTO: 11.2 FL (ref 8.9–12.7)
POTASSIUM SERPL-SCNC: 4.8 MMOL/L (ref 3.5–5.3)
PROT SERPL-MCNC: 6.6 G/DL (ref 6.4–8.4)
RBC # BLD AUTO: 4.2 MILLION/UL (ref 3.81–5.12)
SODIUM SERPL-SCNC: 141 MMOL/L (ref 135–147)
TRIGL SERPL-MCNC: 86 MG/DL
WBC # BLD AUTO: 5.71 THOUSAND/UL (ref 4.31–10.16)

## 2023-10-27 PROCEDURE — 36415 COLL VENOUS BLD VENIPUNCTURE: CPT

## 2023-10-27 PROCEDURE — 80053 COMPREHEN METABOLIC PANEL: CPT

## 2023-10-27 PROCEDURE — 80061 LIPID PANEL: CPT

## 2023-10-27 PROCEDURE — 85025 COMPLETE CBC W/AUTO DIFF WBC: CPT

## 2023-10-31 ENCOUNTER — OFFICE VISIT (OUTPATIENT)
Dept: OBGYN CLINIC | Facility: CLINIC | Age: 48
End: 2023-10-31
Payer: COMMERCIAL

## 2023-10-31 VITALS
HEIGHT: 62 IN | DIASTOLIC BLOOD PRESSURE: 83 MMHG | HEART RATE: 74 BPM | BODY MASS INDEX: 38.64 KG/M2 | WEIGHT: 210 LBS | SYSTOLIC BLOOD PRESSURE: 144 MMHG

## 2023-10-31 DIAGNOSIS — M23.91 INTERNAL DERANGEMENT OF RIGHT KNEE: Primary | ICD-10-CM

## 2023-10-31 PROCEDURE — 99203 OFFICE O/P NEW LOW 30 MIN: CPT | Performed by: FAMILY MEDICINE

## 2023-10-31 NOTE — PROGRESS NOTES
Assessment/Plan:  Assessment/Plan   Diagnoses and all orders for this visit:    Internal derangement of right knee  -     Ambulatory Referral to Sports Medicine  -     MRI knee right  wo contrast; Future        42-year-old female with right knee pain more than 8.5 months duration. Discussed with patient physical exam, radiographs, impression, and plan. X-rays right knee unremarkable for acute osseous abnormality. Physical exam right knee noted for generalized swelling. She has tenderness medial joint line and popliteal fossa. She has full extension and flexion to 120 degrees. There is no appreciable collateral ligament laxity. There is positive Shira's at the medial aspect. Clinical impression is that she may have symptoms from internal derangement. She is still experiencing symptoms despite conservative management of wearing knee brace for more than 3 months, formal therapy and home exercises since 3/16/2023, NSAIDs and icing for more than 3 months. At this time I will refer for MRI of the right knee to evaluate for stress fracture, cartilage defect loose body, meniscus tear with flipped fragment as surgical intervention may be warranted. She is to start taking turmeric at least 1000 mg daily, tart cherry at least 1000 mg daily, glucosamine 2-3 times a day. She will return after having MRI done. Subjective:   Patient ID: Sanam Hernandez is a 50 y.o. female. Chief Complaint   Patient presents with    Right Knee - Pain        42-year-old female presents for evaluation right knee pain more than 8.5 months duration. She denies trauma or inciting event. Pain described as gradual in onset, generalized to the knee, none radiating, worse with activity, associated with clicking and popping, worse with bending and twisting, associated with swelling, and improved with resting. She was seen by orthopedics and had x-rays done which were unremarkable.   She was provided knee brace and referred to physical therapy. She has been attending physical therapy and doing home exercises since 3/16/2023. Manages symptoms with icing and taking NSAIDs. Symptoms persisted and she had follow-up with her primary care physician. She was prescribed meloxicam and advised to follow-up with sports medicine. Knee Pain  This is a new problem. The current episode started more than 1 month ago. The problem occurs constantly. The problem has been waxing and waning. Associated symptoms include arthralgias and joint swelling. Pertinent negatives include no abdominal pain, chest pain, chills, fever, numbness, rash, sore throat or weakness. The symptoms are aggravated by bending, standing, twisting and walking. She has tried rest and NSAIDs for the symptoms. The treatment provided mild relief. The following portions of the patient's history were reviewed and updated as appropriate: She  has a past medical history of Achalasia (2006) and Obesity. She is allergic to promethazine. .    Review of Systems   Constitutional:  Negative for chills and fever. HENT:  Negative for sore throat. Eyes:  Negative for visual disturbance. Respiratory:  Negative for shortness of breath. Cardiovascular:  Negative for chest pain. Gastrointestinal:  Negative for abdominal pain. Genitourinary:  Negative for flank pain. Musculoskeletal:  Positive for arthralgias and joint swelling. Skin:  Negative for rash and wound. Neurological:  Negative for weakness and numbness. Hematological:  Does not bruise/bleed easily. Psychiatric/Behavioral:  Negative for self-injury. Objective:  Vitals:    10/31/23 1450   BP: 144/83   Pulse: 74   Weight: 95.3 kg (210 lb)   Height: 5' 2" (1.575 m)      Right Ankle Exam     Muscle Strength   Dorsiflexion:  5/5  Plantar flexion:  5/5       Right Knee Exam     Tenderness   The patient is experiencing tenderness in the medial joint line.     Range of Motion   Extension:  normal   Flexion:  120 Tests   Shira:  Medial - positive Lateral - negative  Varus: negative Valgus: negative    Other   Swelling: mild      Right Hip Exam     Muscle Strength   Flexion: 5/5       Left Hip Exam     Muscle Strength   Flexion: 5/5           Strength/Myotome Testing     Right Ankle/Foot   Dorsiflexion: 5  Plantar flexion: 5      Physical Exam  Vitals and nursing note reviewed. Constitutional:       Appearance: Normal appearance. She is well-developed. She is not ill-appearing or diaphoretic. HENT:      Head: Normocephalic and atraumatic. Right Ear: External ear normal.      Left Ear: External ear normal.   Eyes:      Conjunctiva/sclera: Conjunctivae normal.   Neck:      Trachea: No tracheal deviation. Cardiovascular:      Rate and Rhythm: Normal rate. Pulmonary:      Effort: Pulmonary effort is normal. No respiratory distress. Abdominal:      General: There is no distension. Musculoskeletal:         General: Swelling and tenderness present. No deformity or signs of injury. Right knee:      Instability Tests: Medial Shira test positive. Lateral Shira test negative. Skin:     General: Skin is warm and dry. Coloration: Skin is not jaundiced or pale. Neurological:      Mental Status: She is alert and oriented to person, place, and time. Psychiatric:         Mood and Affect: Mood normal.         Behavior: Behavior normal.         Thought Content:  Thought content normal.         Judgment: Judgment normal.

## 2023-10-31 NOTE — PATIENT INSTRUCTIONS
Over the counter vitamins:    -Turmeric vitamin at least 1000 mg daily    - Tart cherry vitamin at least 1000 mg daily    - Glucosamine-chondrointin 2- 3 times daily

## 2023-11-09 ENCOUNTER — HOSPITAL ENCOUNTER (OUTPATIENT)
Dept: MRI IMAGING | Facility: CLINIC | Age: 48
Discharge: HOME | End: 2023-11-09
Payer: COMMERCIAL

## 2023-11-09 DIAGNOSIS — M23.91 INTERNAL DERANGEMENT OF RIGHT KNEE: ICD-10-CM

## 2023-11-09 PROCEDURE — 73721 MRI JNT OF LWR EXTRE W/O DYE: CPT

## 2023-11-09 PROCEDURE — G1004 CDSM NDSC: HCPCS

## 2023-11-20 ENCOUNTER — OFFICE VISIT (OUTPATIENT)
Dept: OBGYN CLINIC | Facility: CLINIC | Age: 48
End: 2023-11-20
Payer: COMMERCIAL

## 2023-11-20 VITALS
HEART RATE: 82 BPM | HEIGHT: 62 IN | BODY MASS INDEX: 38.46 KG/M2 | SYSTOLIC BLOOD PRESSURE: 124 MMHG | DIASTOLIC BLOOD PRESSURE: 79 MMHG | WEIGHT: 209 LBS

## 2023-11-20 DIAGNOSIS — M17.11 PRIMARY OSTEOARTHRITIS OF RIGHT KNEE: Primary | ICD-10-CM

## 2023-11-20 PROCEDURE — 20610 DRAIN/INJ JOINT/BURSA W/O US: CPT | Performed by: FAMILY MEDICINE

## 2023-11-20 PROCEDURE — 99213 OFFICE O/P EST LOW 20 MIN: CPT | Performed by: FAMILY MEDICINE

## 2023-11-20 RX ORDER — BUPIVACAINE HYDROCHLORIDE 2.5 MG/ML
1 INJECTION, SOLUTION INFILTRATION; PERINEURAL
Status: COMPLETED | OUTPATIENT
Start: 2023-11-20 | End: 2023-11-20

## 2023-11-20 RX ORDER — TRIAMCINOLONE ACETONIDE 40 MG/ML
80 INJECTION, SUSPENSION INTRA-ARTICULAR; INTRAMUSCULAR
Status: COMPLETED | OUTPATIENT
Start: 2023-11-20 | End: 2023-11-20

## 2023-11-20 RX ORDER — BUPIVACAINE HYDROCHLORIDE 2.5 MG/ML
4 INJECTION, SOLUTION INFILTRATION; PERINEURAL
Status: COMPLETED | OUTPATIENT
Start: 2023-11-20 | End: 2023-11-20

## 2023-11-20 RX ORDER — VIT C/B6/B5/MAGNESIUM/HERB 173 50-5-6-5MG
CAPSULE ORAL
COMMUNITY

## 2023-11-20 RX ADMIN — BUPIVACAINE HYDROCHLORIDE 4 ML: 2.5 INJECTION, SOLUTION INFILTRATION; PERINEURAL at 13:15

## 2023-11-20 RX ADMIN — BUPIVACAINE HYDROCHLORIDE 1 ML: 2.5 INJECTION, SOLUTION INFILTRATION; PERINEURAL at 13:15

## 2023-11-20 RX ADMIN — TRIAMCINOLONE ACETONIDE 80 MG: 40 INJECTION, SUSPENSION INTRA-ARTICULAR; INTRAMUSCULAR at 13:15

## 2023-11-20 NOTE — PROGRESS NOTES
Assessment/Plan:  Assessment/Plan   Diagnoses and all orders for this visit:    Primary osteoarthritis of right knee  -     Large joint arthrocentesis: R knee    Other orders  -     Tart Cherry 1200 MG CAPS; Take by mouth  -     Turmeric (QC Tumeric Complex) 500 MG CAPS; Take by mouth        70-year-old female with right knee pain more than 9 months duration. Discussed with patient MRI results, impression, and plan. MRI right knee noted for mild cartilage irregularity femoral trochlea and patella with grade 2 fissuring lateral patella facet mild medial tibiofemoral degenerative changes. Clinical impression is that she has symptoms of aggravated degenerative changes. I discussed treatment regimen of injection. Advised surgery not warranted at this time. I administered mixture of 3 cc 0.25% bupivacaine and 2 cc Kenalog to the right knee without complication. She was advised if no improvement in 3 to 4 weeks we will consider visco injection. She was also given information to consider PRP. She will follow-up as needed. Subjective:   Patient ID: Carlito Love is a 50 y.o. female. Chief Complaint   Patient presents with    Right Knee - Follow-up        70-year-old female following for right knee pain more than 9 months duration. She was last seen by me 3 weeks ago at which point she was referred for MRI of the right knee due to concern for internal derangement. She denies changes in symptoms since her last visit. She has pain described as generalized to the knee, nonradiating, worse with activity, associated with clicking, associated with swelling, worse with bending and twisting, and improved with resting. Knee Pain  This is a new problem. The current episode started more than 1 month ago. The problem occurs daily. The problem has been waxing and waning. Associated symptoms include arthralgias and joint swelling. Pertinent negatives include no numbness or weakness.  The symptoms are aggravated by standing, walking, twisting and bending. She has tried rest, position changes, NSAIDs and ice (Physical therapy, home exercise) for the symptoms. The treatment provided mild relief. Review of Systems   Musculoskeletal:  Positive for arthralgias and joint swelling. Neurological:  Negative for weakness and numbness. Objective:  Vitals:    11/20/23 1258   BP: 124/79   Pulse: 82   Weight: 94.8 kg (209 lb)   Height: 5' 2" (1.575 m)      Ortho Exam    Physical Exam  Vitals and nursing note reviewed. Constitutional:       Appearance: Normal appearance. She is well-developed. She is not ill-appearing or diaphoretic. HENT:      Head: Normocephalic and atraumatic. Right Ear: External ear normal.      Left Ear: External ear normal.   Eyes:      Conjunctiva/sclera: Conjunctivae normal.   Neck:      Trachea: No tracheal deviation. Cardiovascular:      Rate and Rhythm: Normal rate. Pulmonary:      Effort: Pulmonary effort is normal. No respiratory distress. Abdominal:      General: There is no distension. Skin:     General: Skin is warm and dry. Coloration: Skin is not jaundiced or pale. Neurological:      Mental Status: She is alert and oriented to person, place, and time. Psychiatric:         Mood and Affect: Mood normal.         Behavior: Behavior normal.         Thought Content: Thought content normal.         Judgment: Judgment normal.         I have personally reviewed pertinent films in PACS and my interpretation is  . Mild degenerative changes. No meniscus pathology. Large joint arthrocentesis: R knee  Universal Protocol:  Consent: Verbal consent obtained. Risks and benefits: risks, benefits and alternatives were discussed  Consent given by: patient  Time out: Immediately prior to procedure a "time out" was called to verify the correct patient, procedure, equipment, support staff and site/side marked as required.   Patient understanding: patient states understanding of the procedure being performed  Patient consent: the patient's understanding of the procedure matches consent given  Procedure consent: procedure consent matches procedure scheduled  Relevant documents: relevant documents present and verified  Test results: test results available and properly labeled  Site marked: the operative site was marked  Radiology Images displayed and confirmed.  If images not available, report reviewed: imaging studies available  Required items: required blood products, implants, devices, and special equipment available  Patient identity confirmed: verbally with patient  Supporting Documentation  Indications: pain   Procedure Details  Location: knee - R knee  Preparation: Patient was prepped and draped in the usual sterile fashion  Needle gauge: 21G 2"  Approach: anteromedial  Medications administered: 4 mL bupivacaine 0.25 %; 1 mL bupivacaine 0.25 %; 80 mg triamcinolone acetonide 40 mg/mL    Patient tolerance: patient tolerated the procedure well with no immediate complications  Dressing:  Sterile dressing applied

## 2023-11-20 NOTE — PATIENT INSTRUCTIONS
Viscosupplementation (covered by insurance)  - synthetic hyaluronic acid       PRP injection (Platelet Rich Plasma)  - not covered by insurance

## 2023-12-11 DIAGNOSIS — F99 INSOMNIA DUE TO OTHER MENTAL DISORDER: ICD-10-CM

## 2023-12-11 DIAGNOSIS — F51.05 INSOMNIA DUE TO OTHER MENTAL DISORDER: ICD-10-CM

## 2023-12-11 RX ORDER — TRAZODONE HYDROCHLORIDE 50 MG/1
50 TABLET ORAL
Qty: 30 TABLET | Refills: 1 | Status: SHIPPED | OUTPATIENT
Start: 2023-12-11

## 2024-01-02 ENCOUNTER — OFFICE VISIT (OUTPATIENT)
Dept: FAMILY MEDICINE CLINIC | Facility: CLINIC | Age: 49
End: 2024-01-02
Payer: COMMERCIAL

## 2024-01-02 VITALS
OXYGEN SATURATION: 98 % | WEIGHT: 206 LBS | HEART RATE: 77 BPM | HEIGHT: 62 IN | DIASTOLIC BLOOD PRESSURE: 64 MMHG | BODY MASS INDEX: 37.91 KG/M2 | SYSTOLIC BLOOD PRESSURE: 122 MMHG | TEMPERATURE: 99.1 F

## 2024-01-02 DIAGNOSIS — Z12.11 SCREENING FOR COLON CANCER: ICD-10-CM

## 2024-01-02 DIAGNOSIS — F51.05 INSOMNIA DUE TO OTHER MENTAL DISORDER: Primary | ICD-10-CM

## 2024-01-02 DIAGNOSIS — Z12.31 ENCOUNTER FOR SCREENING MAMMOGRAM FOR MALIGNANT NEOPLASM OF BREAST: ICD-10-CM

## 2024-01-02 DIAGNOSIS — F99 INSOMNIA DUE TO OTHER MENTAL DISORDER: Primary | ICD-10-CM

## 2024-01-02 PROCEDURE — 99214 OFFICE O/P EST MOD 30 MIN: CPT | Performed by: FAMILY MEDICINE

## 2024-01-02 RX ORDER — TRAZODONE HYDROCHLORIDE 100 MG/1
100 TABLET ORAL
Qty: 90 TABLET | Refills: 1 | Status: SHIPPED | OUTPATIENT
Start: 2024-01-02 | End: 2024-01-05 | Stop reason: SDUPTHER

## 2024-01-02 NOTE — PROGRESS NOTES
"Assessment/Plan:    No problem-specific Assessment & Plan notes found for this encounter.     Diagnoses and all orders for this visit:    Insomnia due to other mental disorder  -     traZODone (DESYREL) 100 mg tablet; Take 1 tablet (100 mg total) by mouth daily at bedtime    Encounter for screening mammogram for malignant neoplasm of breast  -     Mammo screening bilateral w 3d & cad; Future    Screening for colon cancer  -     Jen          Subjective:      Patient ID: Carolina Perez is a 48 y.o. female.    HPI    Patient presents to the office for follow up.     Reports that she was taking 50 mg nightly and was getting about 4 hours. States that she has been using the Trazodone 100 mg nightly and this has been very helpful. Sleeping about 7 solid hours. She is feeling less fatigue during the day.     No family history of colon cancer. She would like to due jen.     The following portions of the patient's history were reviewed and updated as appropriate: allergies, current medications, past family history, past medical history, past social history, past surgical history, and problem list.    Review of Systems      Objective:  /64   Pulse 77   Temp 99.1 °F (37.3 °C)   Ht 5' 2\" (1.575 m)   Wt 93.4 kg (206 lb)   SpO2 98%   BMI 37.68 kg/m²      Physical Exam  Vitals reviewed.   Constitutional:       General: She is not in acute distress.     Appearance: Normal appearance.   HENT:      Head: Normocephalic and atraumatic.      Right Ear: External ear normal.      Left Ear: External ear normal.      Nose: Nose normal.      Mouth/Throat:      Mouth: Mucous membranes are moist.   Eyes:      Extraocular Movements: Extraocular movements intact.      Conjunctiva/sclera: Conjunctivae normal.   Cardiovascular:      Rate and Rhythm: Normal rate and regular rhythm.      Heart sounds: Normal heart sounds.   Pulmonary:      Effort: Pulmonary effort is normal.      Breath sounds: Normal breath sounds. No " wheezing, rhonchi or rales.   Abdominal:      General: Bowel sounds are normal. There is no distension.      Palpations: Abdomen is soft.      Tenderness: There is no abdominal tenderness.   Musculoskeletal:      Right lower leg: No edema.      Left lower leg: No edema.   Skin:     General: Skin is warm.      Capillary Refill: Capillary refill takes less than 2 seconds.      Findings: No rash.   Neurological:      Mental Status: She is alert. Mental status is at baseline.         DO [facility]  1/2/2024 2:59 PM

## 2024-01-05 DIAGNOSIS — F51.05 INSOMNIA DUE TO OTHER MENTAL DISORDER: ICD-10-CM

## 2024-01-05 DIAGNOSIS — F99 INSOMNIA DUE TO OTHER MENTAL DISORDER: ICD-10-CM

## 2024-01-05 RX ORDER — TRAZODONE HYDROCHLORIDE 100 MG/1
100 TABLET ORAL
Qty: 90 TABLET | Refills: 1 | Status: SHIPPED | OUTPATIENT
Start: 2024-01-05

## 2024-01-24 DIAGNOSIS — F99 INSOMNIA DUE TO OTHER MENTAL DISORDER: ICD-10-CM

## 2024-01-24 DIAGNOSIS — F51.05 INSOMNIA DUE TO OTHER MENTAL DISORDER: ICD-10-CM

## 2024-01-24 RX ORDER — TRAZODONE HYDROCHLORIDE 50 MG/1
50 TABLET ORAL
Qty: 30 TABLET | Refills: 1 | Status: SHIPPED | OUTPATIENT
Start: 2024-01-24

## 2024-04-02 DIAGNOSIS — F51.05 INSOMNIA DUE TO OTHER MENTAL DISORDER: ICD-10-CM

## 2024-04-02 DIAGNOSIS — F99 INSOMNIA DUE TO OTHER MENTAL DISORDER: ICD-10-CM

## 2024-04-02 RX ORDER — TRAZODONE HYDROCHLORIDE 100 MG/1
100 TABLET ORAL
Qty: 90 TABLET | Refills: 0 | Status: SHIPPED | OUTPATIENT
Start: 2024-04-02

## 2024-09-19 ENCOUNTER — VBI (OUTPATIENT)
Dept: ADMINISTRATIVE | Facility: OTHER | Age: 49
End: 2024-09-19

## 2024-09-19 NOTE — TELEPHONE ENCOUNTER
09/19/24 9:47 AM     Chart reviewed for CRC: Colonoscopy was/were not submitted to the patient's insurance.     Ashley Mclain MA   [facility]

## 2024-10-22 ENCOUNTER — OFFICE VISIT (OUTPATIENT)
Dept: FAMILY MEDICINE CLINIC | Facility: CLINIC | Age: 49
End: 2024-10-22
Payer: COMMERCIAL

## 2024-10-22 VITALS
HEIGHT: 62 IN | OXYGEN SATURATION: 98 % | DIASTOLIC BLOOD PRESSURE: 80 MMHG | WEIGHT: 205 LBS | TEMPERATURE: 98.9 F | SYSTOLIC BLOOD PRESSURE: 122 MMHG | HEART RATE: 88 BPM | BODY MASS INDEX: 37.73 KG/M2

## 2024-10-22 DIAGNOSIS — Z13.220 ENCOUNTER FOR LIPID SCREENING FOR CARDIOVASCULAR DISEASE: ICD-10-CM

## 2024-10-22 DIAGNOSIS — Z00.00 ANNUAL PHYSICAL EXAM: Primary | ICD-10-CM

## 2024-10-22 DIAGNOSIS — Z13.6 ENCOUNTER FOR LIPID SCREENING FOR CARDIOVASCULAR DISEASE: ICD-10-CM

## 2024-10-22 DIAGNOSIS — Z12.4 CERVICAL CANCER SCREENING: ICD-10-CM

## 2024-10-22 DIAGNOSIS — Z23 ENCOUNTER FOR IMMUNIZATION: ICD-10-CM

## 2024-10-22 DIAGNOSIS — Z12.11 COLON CANCER SCREENING: ICD-10-CM

## 2024-10-22 PROCEDURE — 99396 PREV VISIT EST AGE 40-64: CPT | Performed by: FAMILY MEDICINE

## 2024-10-22 PROCEDURE — 90656 IIV3 VACC NO PRSV 0.5 ML IM: CPT

## 2024-10-22 PROCEDURE — 90471 IMMUNIZATION ADMIN: CPT

## 2024-10-22 NOTE — PATIENT INSTRUCTIONS
"Patient Education     Routine physical for adults   The Basics   Written by the doctors and editors at [facility]   What is a physical? -- A physical is a routine visit, or \"check-up,\" with your doctor. You might also hear it called a \"wellness visit\" or \"preventive visit.\"  During each visit, the doctor will:   Ask about your physical and mental health   Ask about your habits, behaviors, and lifestyle   Do an exam   Give you vaccines if needed   Talk to you about any medicines you take   Give advice about your health   Answer your questions  Getting regular check-ups is an important part of taking care of your health. It can help your doctor find and treat any problems you have. But it's also important for preventing health problems.  A routine physical is different from a \"sick visit.\" A sick visit is when you see a doctor because of a health concern or problem. Since physicals are scheduled ahead of time, you can think about what you want to ask the doctor.  How often should I get a physical? -- It depends on your age and health. In general, for people age 21 years and older:   If you are younger than 50 years, you might be able to get a physical every 3 years.   If you are 50 years or older, your doctor might recommend a physical every year.  If you have an ongoing health condition, like diabetes or high blood pressure, your doctor will probably want to see you more often.  What happens during a physical? -- In general, each visit will include:   Physical exam - The doctor or nurse will check your height, weight, heart rate, and blood pressure. They will also look at your eyes and ears. They will ask about how you are feeling and whether you have any symptoms that bother you.   Medicines - It's a good idea to bring a list of all the medicines you take to each doctor visit. Your doctor will talk to you about your medicines and answer any questions. Tell them if you are having any side effects that bother you. You " "should also tell them if you are having trouble paying for any of your medicines.   Habits and behaviors - This includes:   Your diet   Your exercise habits   Whether you smoke, drink alcohol, or use drugs   Whether you are sexually active   Whether you feel safe at home  Your doctor will talk to you about things you can do to improve your health and lower your risk of health problems. They will also offer help and support. For example, if you want to quit smoking, they can give you advice and might prescribe medicines. If you want to improve your diet or get more physical activity, they can help you with this, too.   Lab tests, if needed - The tests you get will depend on your age and situation. For example, your doctor might want to check your:   Cholesterol   Blood sugar   Iron level   Vaccines - The recommended vaccines will depend on your age, health, and what vaccines you already had. Vaccines are very important because they can prevent certain serious or deadly infections.   Discussion of screening - \"Screening\" means checking for diseases or other health problems before they cause symptoms. Your doctor can recommend screening based on your age, risk, and preferences. This might include tests to check for:   Cancer, such as breast, prostate, cervical, ovarian, colorectal, prostate, lung, or skin cancer   Sexually transmitted infections, such as chlamydia and gonorrhea   Mental health conditions like depression and anxiety  Your doctor will talk to you about the different types of screening tests. They can help you decide which screenings to have. They can also explain what the results might mean.   Answering questions - The physical is a good time to ask the doctor or nurse questions about your health. If needed, they can refer you to other doctors or specialists, too.  Adults older than 65 years often need other care, too. As you get older, your doctor will talk to you about:   How to prevent falling at " home   Hearing or vision tests   Memory testing   How to take your medicines safely   Making sure that you have the help and support you need at home  All topics are updated as new evidence becomes available and our peer review process is complete.  This topic retrieved from [employer] on: May 02, 2024.  Topic 962318 Version 1.0  Release: 32.4.3 - C32.122  © 2024 UpToDate, Inc. and/or its affiliates. All rights reserved.  Consumer Information Use and Disclaimer   Disclaimer: This generalized information is a limited summary of diagnosis, treatment, and/or medication information. It is not meant to be comprehensive and should be used as a tool to help the user understand and/or assess potential diagnostic and treatment options. It does NOT include all information about conditions, treatments, medications, side effects, or risks that may apply to a specific patient. It is not intended to be medical advice or a substitute for the medical advice, diagnosis, or treatment of a health care provider based on the health care provider's examination and assessment of a patient's specific and unique circumstances. Patients must speak with a health care provider for complete information about their health, medical questions, and treatment options, including any risks or benefits regarding use of medications. This information does not endorse any treatments or medications as safe, effective, or approved for treating a specific patient. UpToDate, Inc. and its affiliates disclaim any warranty or liability relating to this information or the use thereof.The use of this information is governed by the Terms of Use, available at https://www.wolters[employer]ab[employer] Aestheticsuwer.com/en/know/clinical-effectiveness-terms. 2024© UpToDate, Inc. and its affiliates and/or licensors. All rights reserved.  Copyright   © 2024 UpToDate, Inc. and/or its affiliates. All rights reserved.

## 2024-10-22 NOTE — PROGRESS NOTES
Adult Annual Physical  Name: Carolina Perez      : 1975      MRN: [id number]  Encounter Provider: Cathryn Kaufman DO  Encounter Date: 10/22/2024   Encounter department: [facility] [address]    Assessment & Plan  Encounter for immunization    Orders:    influenza vaccine preservative-free 0.5 mL IM (Fluzone, Afluria, Fluarix, Flulaval)    Annual physical exam    Orders:    CBC and differential; Future    Comprehensive metabolic panel; Future    Lipid panel; Future    BMI 37.0-37.9, adult    Orders:    CBC and differential; Future    Comprehensive metabolic panel; Future    Lipid panel; Future    Encounter for lipid screening for cardiovascular disease    Orders:    Lipid panel; Future    Cervical cancer screening    Orders:    Ambulatory Referral to Obstetrics / Gynecology; Future    Colon cancer screening    Orders:    Cologuard    Immunizations and preventive care screenings were discussed with patient today. Appropriate education was printed on patient's after visit summary.    Counseling:  Alcohol/drug use: discussed moderation in alcohol intake, the recommendations for healthy alcohol use, and avoidance of illicit drug use.  Dental Health: discussed importance of regular tooth brushing, flossing, and dental visits.  Sexual health: discussed sexually transmitted diseases, partner selection, use of condoms, avoidance of unintended pregnancy, and contraceptive alternatives.  Exercise: the importance of regular exercise/physical activity was discussed. Recommend exercise 3-5 times per week for at least 30 minutes.        History of Present Illness     Adult Annual Physical:  Patient presents for annual physical.     Diet and Physical Activity:  - Diet/Nutrition: well balanced diet.  - Exercise: walking, moderate cardiovascular exercise, 3-4 times a week on average and less than 30 minutes on average.    Depression Screening:  - PHQ-2 Score: 2    General Health:  - Sleep:  "sleeps well.  - Hearing: normal hearing bilateral ears.  - Vision: goes for regular eye exams, most recent eye exam < 1 year ago and wears glasses.  - Dental: regular dental visits and brushes teeth twice daily. has dentures.    /GYN Health:  - Follows with GYN: no.   - Menopause: premenopausal.   - Last menstrual cycle: 9/20/2024.     Review of Systems      Objective     /80   Pulse 88   Temp 98.9 °F (37.2 °C)   Ht 5' 2\" (1.575 m)   Wt 93 kg (205 lb)   SpO2 98%   BMI 37.49 kg/m²     Physical Exam  Vitals reviewed.   Constitutional:       General: She is not in acute distress.     Appearance: Normal appearance.   HENT:      Head: Normocephalic and atraumatic.      Right Ear: External ear normal.      Left Ear: External ear normal.      Nose: Nose normal.      Mouth/Throat:      Mouth: Mucous membranes are moist.   Eyes:      Extraocular Movements: Extraocular movements intact.      Conjunctiva/sclera: Conjunctivae normal.      Pupils: Pupils are equal, round, and reactive to light.   Cardiovascular:      Rate and Rhythm: Normal rate and regular rhythm.      Heart sounds: Normal heart sounds.   Pulmonary:      Effort: Pulmonary effort is normal.      Breath sounds: Normal breath sounds.   Abdominal:      General: Bowel sounds are normal. There is no distension.      Palpations: Abdomen is soft.      Tenderness: There is no abdominal tenderness.   Musculoskeletal:      Cervical back: Neck supple.      Right lower leg: No edema.      Left lower leg: No edema.   Lymphadenopathy:      Cervical: No cervical adenopathy.   Skin:     General: Skin is warm.      Capillary Refill: Capillary refill takes less than 2 seconds.      Findings: No rash.   Neurological:      Mental Status: She is alert. Mental status is at baseline.           DO Laura Chirinosroe [facility]  10/22/2024 2:38 PM      "

## 2024-11-14 DIAGNOSIS — F99 INSOMNIA DUE TO OTHER MENTAL DISORDER: ICD-10-CM

## 2024-11-14 DIAGNOSIS — F51.05 INSOMNIA DUE TO OTHER MENTAL DISORDER: ICD-10-CM

## 2024-11-15 RX ORDER — TRAZODONE HYDROCHLORIDE 100 MG/1
100 TABLET ORAL
Qty: 90 TABLET | Refills: 1 | Status: SHIPPED | OUTPATIENT
Start: 2024-11-15

## 2025-01-28 DIAGNOSIS — F99 INSOMNIA DUE TO OTHER MENTAL DISORDER: ICD-10-CM

## 2025-01-28 DIAGNOSIS — F51.05 INSOMNIA DUE TO OTHER MENTAL DISORDER: ICD-10-CM

## 2025-01-28 RX ORDER — TRAZODONE HYDROCHLORIDE 50 MG/1
50 TABLET, FILM COATED ORAL
Qty: 30 TABLET | Refills: 0 | OUTPATIENT
Start: 2025-01-28

## 2025-01-28 RX ORDER — TRAZODONE HYDROCHLORIDE 100 MG/1
100 TABLET ORAL
Qty: 90 TABLET | Refills: 0 | Status: SHIPPED | OUTPATIENT
Start: 2025-01-28

## 2025-02-17 ENCOUNTER — OFFICE VISIT (OUTPATIENT)
Age: 50
End: 2025-02-17
Payer: COMMERCIAL

## 2025-02-17 VITALS
HEART RATE: 68 BPM | TEMPERATURE: 97 F | DIASTOLIC BLOOD PRESSURE: 62 MMHG | BODY MASS INDEX: 39.51 KG/M2 | SYSTOLIC BLOOD PRESSURE: 132 MMHG | WEIGHT: 216 LBS | OXYGEN SATURATION: 99 % | RESPIRATION RATE: 18 BRPM

## 2025-02-17 DIAGNOSIS — M65.4 DE QUERVAIN'S TENOSYNOVITIS, RIGHT: Primary | ICD-10-CM

## 2025-02-17 PROCEDURE — 99213 OFFICE O/P EST LOW 20 MIN: CPT | Performed by: PHYSICIAN ASSISTANT

## 2025-02-17 RX ORDER — METHYLPREDNISOLONE 4 MG/1
TABLET ORAL
Qty: 21 TABLET | Refills: 0 | Status: SHIPPED | OUTPATIENT
Start: 2025-02-17

## 2025-02-17 RX ORDER — RIBOFLAVIN (VITAMIN B2) 100 MG
1 TABLET ORAL 3 TIMES DAILY
COMMUNITY

## 2025-02-17 NOTE — PATIENT INSTRUCTIONS
"Patient Education     de Quervain tendinopathy   The Basics   Written by the doctors and editors at [facility]   What is de Quervain tendinopathy? -- de Quervain tendinopathy is a condition that causes pain in the thumb and wrist. It is caused by a problem with a tendon. Tendons are strong bands of tissue that connect muscles to bones. de Quervain tendinopathy is sometimes called \"de Quervain tenosynovitis.\"  de Quervain tendinopathy involves tendons that connect the forearm muscles to the thumb. These tendons and the covering around them get inflamed. This causes symptoms.  Most often, de Quervain tendinopathy happens when people use their wrist and thumb too much in certain ways. This includes gripping or grabbing objects (like a tool, golf club, or tennis racket) over and over. But, it can also happen to people for no obvious reason.  What are the symptoms of de Quervain tendinopathy? -- Symptoms include:   Pain in the wrist or thumb   Trouble gripping objects   Swelling in the wrist  Will I need tests? -- Probably not. Your doctor can usually tell if you have de Quervain tendinopathy by learning about your symptoms and doing an exam. During the exam, they will carefully check your thumb, hand, and wrist.  How is de Quervain tendinopathy treated? -- Treatment includes:   Resting your thumb - To avoid moving your thumb, you can wear a splint made for keeping the thumb still.   Ice - You can put a cold gel pack, bag of ice, or bag of frozen vegetables on the painful or swollen area every 4 to 6 hours, for 15 minutes each time.   Pain-relieving medicines called \"NSAIDs\" - NSAIDs are a large group of medicines that includes ibuprofen (sample brand names: Advil, Motrin) and naproxen (sample brand name: Aleve).   Exercises - After your symptoms improve, your doctor or nurse will show you exercises to help your wrist and thumb move more easily.  If your symptoms don't get better with treatment, your doctor might recommend " other treatments. These can include:   Getting a shot of a steroid medicine around the tendon in your wrist - This can help with pain.   Surgery to cut or loosen the covering around the tendon  All topics are updated as new evidence becomes available and our peer review process is complete.  This topic retrieved from [employer] on: Feb 26, 2024.  Topic 72596 Version 12.0  Release: 32.2.4 - C32.56  © 2024 UpToDate, Inc. and/or its affiliates. All rights reserved.  Consumer Information Use and Disclaimer   Disclaimer: This generalized information is a limited summary of diagnosis, treatment, and/or medication information. It is not meant to be comprehensive and should be used as a tool to help the user understand and/or assess potential diagnostic and treatment options. It does NOT include all information about conditions, treatments, medications, side effects, or risks that may apply to a specific patient. It is not intended to be medical advice or a substitute for the medical advice, diagnosis, or treatment of a health care provider based on the health care provider's examination and assessment of a patient's specific and unique circumstances. Patients must speak with a health care provider for complete information about their health, medical questions, and treatment options, including any risks or benefits regarding use of medications. This information does not endorse any treatments or medications as safe, effective, or approved for treating a specific patient. UpToDate, Inc. and its affiliates disclaim any warranty or liability relating to this information or the use thereof.The use of this information is governed by the Terms of Use, available at https://www.wolterskluwer.com/en/know/clinical-effectiveness-terms. 2024© UpToDate, Inc. and its affiliates and/or licensors. All rights reserved.  Copyright   © 2024 UpToDate, Inc. and/or its affiliates. All rights reserved.

## 2025-02-17 NOTE — PROGRESS NOTES
[facility]        NAME: Carolina Perez is a 50 y.o. female  : 1975    MRN: [id number]  DATE: 2025  TIME: 4:46 PM    Assessment and Plan   De Quervain's tenosynovitis, right [M65.4]  1. De Quervain's tenosynovitis, right  methylPREDNISolone 4 MG tablet therapy pack    Ambulatory Referral to Orthopedic Surgery            Patient Instructions     Continue to wear your thumb spica brace  Cold compresses for 20 minutes 3 times daily  No grasping with the right hand    Medrol Dosepak as directed  May use Tylenol 500 mg every 6 hours for additional pain relief  Orthopedic referral generated on your behalf    Follow up with PCP in 3-5 days.  Proceed to  ER if symptoms worsen.      Chief Complaint     Chief Complaint   Patient presents with    Hand Pain     Pt states she has right thumb/wrist pain since mid November. Pt was chopping ice the other day and pain has increased.         History of Present Illness       50-year-old RHD female reporting 3 months of right thumb pain over the dorsal region.  Denies injury, trauma or surgery.  Explains she was ice breaking aspirin at home yesterday which exacerbated her symptoms.  Patient purchased a thumb spica brace that she has been using and reports it has helped for short periods of time.  Patient is also using ibuprofen and Tylenol with no improvement.        Review of Systems   Review of Systems   Constitutional:  Negative for fever.   Respiratory:  Negative for cough.    Gastrointestinal:  Negative for vomiting.   Skin:  Negative for wound.   Neurological:  Negative for headaches.         Current Medications       Current Outpatient Medications:     glucosamine-chondroitin 500-400 MG tablet, Take 1 tablet by mouth 3 (three) times a day, Disp: , Rfl:     methylPREDNISolone 4 MG tablet therapy pack, Use as directed on package, Disp: 21 tablet, Rfl: 0    Tart Cherry 1200 MG CAPS, Take by mouth, Disp: , Rfl:     traZODone (DESYREL) 100 mg tablet, TAKE  1 TABLET (100 MG TOTAL) BY MOUTH DAILY AT BEDTIME, Disp: 90 tablet, Rfl: 0    Turmeric (QC Tumeric Complex) 500 MG CAPS, Take by mouth (Patient not taking: Reported on 2025), Disp: , Rfl:     Current Allergies     Allergies as of 2025 - Reviewed 2025   Allergen Reaction Noted    Promethazine Other (See Comments) 2018            The following portions of the patient's history were reviewed and updated as appropriate: allergies, current medications, past family history, past medical history, past social history, past surgical history and problem list.     Past Medical History:   Diagnosis Date    Achalasia 2006    Allergic     Anxiety     Depression     Obesity        Past Surgical History:   Procedure Laterality Date     SECTION      CHOLECYSTECTOMY      RI I&D VULVA/PERINEAL ABSCESS N/A 2022    Procedure: INCISION AND DRAINAGE (I&D) PERINEAL;  Surgeon: Eric Perkins MD;  Location: [facility];  Service: General    RI LAPS ESOPHAGOMYOTOMY W/FUNDOPLASTY IF PERFORMED N/A 2019    Procedure: LAPAROSCOPIC HELLER MYOTOMY AND PARTIAL FUNDOPLICATION, ALEX;  Surgeon: Seema Jackson MD;  Location:  MAIN OR;  Service: Thoracic    UPPER GASTROINTESTINAL ENDOSCOPY         Family History   Problem Relation Age of Onset    Asthma Mother     Hypertension Mother     Diabetes Mother     Hyperlipidemia Mother     Arthritis Mother     Cancer Father     Mental illness Father         PTSD    Depression Father          Medications have been verified.        Objective   /62   Pulse 68   Temp (!) 97 °F (36.1 °C)   Resp 18   Wt 98 kg (216 lb)   SpO2 99%   BMI 39.51 kg/m²        Physical Exam     Physical Exam  Vitals and nursing note reviewed.   Constitutional:       General: She is not in acute distress.     Appearance: Normal appearance. She is normal weight.   Eyes:      General: No scleral icterus.     Extraocular Movements: Extraocular movements intact.       Conjunctiva/sclera: Conjunctivae normal.      Pupils: Pupils are equal, round, and reactive to light.   Cardiovascular:      Rate and Rhythm: Normal rate and regular rhythm.      Pulses: Normal pulses.   Pulmonary:      Effort: Pulmonary effort is normal. No respiratory distress.   Musculoskeletal:      Right wrist: No swelling, deformity, effusion, lacerations, tenderness, bony tenderness, snuff box tenderness or crepitus. Normal range of motion. Normal pulse.      Left wrist: Normal.        Hands:       Cervical back: Normal range of motion.      Comments: Positive right Finkelstein maneuver.  Negative Phalen and Tinea.  Negative grind test.   Skin:     General: Skin is warm.      Capillary Refill: Capillary refill takes less than 2 seconds.      Findings: No bruising, erythema or lesion.   Neurological:      General: No focal deficit present.      Mental Status: She is alert and oriented to person, place, and time.      Coordination: Coordination normal.      Gait: Gait normal.   Psychiatric:         Mood and Affect: Mood normal.         Behavior: Behavior normal.         Thought Content: Thought content normal.         Judgment: Judgment normal.

## 2025-03-20 ENCOUNTER — OFFICE VISIT (OUTPATIENT)
Dept: OBGYN CLINIC | Facility: CLINIC | Age: 50
End: 2025-03-20
Payer: COMMERCIAL

## 2025-03-20 ENCOUNTER — APPOINTMENT (OUTPATIENT)
Dept: RADIOLOGY | Facility: AMBULARY SURGERY CENTER | Age: 50
End: 2025-03-20
Attending: SURGERY
Payer: COMMERCIAL

## 2025-03-20 VITALS — HEIGHT: 62 IN | BODY MASS INDEX: 39.75 KG/M2 | WEIGHT: 216 LBS

## 2025-03-20 DIAGNOSIS — M65.4 DE QUERVAIN'S TENOSYNOVITIS, RIGHT: Primary | ICD-10-CM

## 2025-03-20 DIAGNOSIS — M79.641 RIGHT HAND PAIN: ICD-10-CM

## 2025-03-20 DIAGNOSIS — M18.11 PRIMARY OSTEOARTHRITIS OF FIRST CARPOMETACARPAL JOINT OF RIGHT HAND: ICD-10-CM

## 2025-03-20 PROCEDURE — 73130 X-RAY EXAM OF HAND: CPT

## 2025-03-20 PROCEDURE — 20550 NJX 1 TENDON SHEATH/LIGAMENT: CPT | Performed by: SURGERY

## 2025-03-20 PROCEDURE — 99203 OFFICE O/P NEW LOW 30 MIN: CPT | Performed by: SURGERY

## 2025-03-20 RX ORDER — LIDOCAINE HYDROCHLORIDE 10 MG/ML
1 INJECTION, SOLUTION INFILTRATION; PERINEURAL
Status: COMPLETED | OUTPATIENT
Start: 2025-03-20 | End: 2025-03-20

## 2025-03-20 RX ORDER — DEXAMETHASONE SODIUM PHOSPHATE 10 MG/ML
40 INJECTION, SOLUTION INTRAMUSCULAR; INTRAVENOUS
Status: COMPLETED | OUTPATIENT
Start: 2025-03-20 | End: 2025-03-20

## 2025-03-20 RX ADMIN — LIDOCAINE HYDROCHLORIDE 1 ML: 10 INJECTION, SOLUTION INFILTRATION; PERINEURAL at 13:15

## 2025-03-20 RX ADMIN — DEXAMETHASONE SODIUM PHOSPHATE 40 MG: 10 INJECTION, SOLUTION INTRAMUSCULAR; INTRAVENOUS at 13:15

## 2025-03-20 NOTE — ASSESSMENT & PLAN NOTE
I discussed with her that this is her primary issue and can be treated nonsurgically with nighttime splint, cortisone injection, physical therapy, and anti-inflammatory medication.  Referral to PT/OT was provided at today's visit.  Cortisone injection to the first extensor compartment provided at today's visit.  She tolerated this procedure well.  Discussed that it may take 1 to 2 injections to begin to provide significant improvement of her symptoms.  I did briefly discuss surgical intervention with her at today's visit.  Orders:    XR hand 3+ vw right; Future    Durable Medical Equipment    Ambulatory Referral to PT/OT Hand Therapy; Future

## 2025-03-20 NOTE — PROGRESS NOTES
Bandar Appiah M.D.  Attending, Orthopaedic Surgery  Hand, Wrist, and Elbow Surgery  [facility]      ORTHOPAEDIC HAND, WRIST, AND ELBOW OFFICE  VISIT       ASSESSMENT/PLAN:        Assessment & Plan  De Quervain's tenosynovitis, right  I discussed with her that this is her primary issue and can be treated nonsurgically with nighttime splint, cortisone injection, physical therapy, and anti-inflammatory medication.  Referral to PT/OT was provided at today's visit.  Cortisone injection to the first extensor compartment provided at today's visit.  She tolerated this procedure well.  Discussed that it may take 1 to 2 injections to begin to provide significant improvement of her symptoms.  I did briefly discuss surgical intervention with her at today's visit.  Orders:    XR hand 3+ vw right; Future    Durable Medical Equipment    Ambulatory Referral to PT/OT Hand Therapy; Future    Primary osteoarthritis of first carpometacarpal joint of right hand  I discussed with her that this may be a secondary issue to her de Quervain's.  I recommended topical Voltaren gel, physical therapy, and cortisone injection.  I did review her x-rays with her at today's visit demonstrate type I CMC osteoarthritis.  We did briefly discuss surgical intervention if her symptoms worsen or fail to improve.  Orders:    Ambulatory Referral to PT/OT Hand Therapy; Future      The patient verbalized understanding of exam findings and treatment plan. We engaged in the shared decision-making process and treatment options were discussed at length with the patient. Surgical and conservative management discussed today along with risks and benefits.    Diagnoses and all orders for this visit:    De Quervain's tenosynovitis, right  -     XR hand 3+ vw right; Future    Primary osteoarthritis of first carpometacarpal joint of right hand        Follow Up:  No follow-ups on file.    To Do Next Visit:  Re-evaluation of current  issue      General Discussions:  CMC Arthritis: The anatomy and physiology of carpometacarpal joint arthritis was discussed with the patient today in the office.  Deterioration of the articular cartilage eventually leads to hypermobility at the thumb CMC joint, resulting in joint subluxation, osteophyte formation, cystic changes within the trapezium and base of the first metacarpal, as well as subchondral sclerosis.  Eventually, pain, limited mobility, and compensatory hyperextension at the metacarpophalangeal joint may develop.  While normal activity and usage of the thumb joint may provide a painful experience to the patient, this typically does not result in damage to the thumb or hand.  Treatment options include resting thumb spica splints to decreased joint edema, pain, and inflammation.  Therapy exercises to strengthen the thenar musculature may relieve pain, but do not alter the overall continued development of osteoarthritis.  Oral medications, topical medications, corticosteroid injections may decrease pain and increase overall function.  Eventually, approximately 5% of patients may require surgical intervention.                                                                                                                                                                                                 De Quervain Tenosynovitis: The anatomy and physiology of de Quervain's tenosynovitis was discussed with the patient today in the office.  Edema and increased contact pressure within the first dorsal extensor compartment at the radial styloid can cause pain, crepitation, and limitation of function.  Treatment options include resting thumb spica splints to decrease edema, oral anti-inflammatory medications, home or formal therapy exercises, up to 2 steroid injections within the first dorsal extensor compartment, or surgical release.  While majority of patients do respond to conservative treatment, up to 20% may  require surgical release.         ____________________________________________________________________________________________________________________________________________      CHIEF COMPLAINT:  Chief Complaint   Patient presents with    Right Hand - Pain     Patient is having right cmc pain going for XR today        SUBJECTIVE:  Carolina Perez is a 50 y.o. year old right HD female who presents today for initial consultation right hand pain referred by Jose R Schaefer PA-C.  She states that she has been having ongoing volar and dorsal first finger pain and discomfort.  She states that it does radiate up into the middle of her forearm, but never into her elbow.  She does work at a group home and states that typing, driving, and writing does exacerbate her symptoms.  She has not attempted any cortisone injections, bracing, or physical therapy.  She denies any numbness or tingling.      Pain/symptom timing:  Worse during the day when active  Pain/symptom context:  Worse with activites and work  Pain/symptom modifying factors:  Rest makes better, activities make worse  Pain/symptom associated signs/symptoms: none    Prior treatment   NSAIDsYes   Injections No   Bracing/Orthotics No    Physical Therapy No     I have personally reviewed all the relevant PMH, PSH, SH, FH, Medications and allergies      PAST MEDICAL HISTORY:  Past Medical History:   Diagnosis Date    Achalasia 2006    Allergic     Anxiety     Depression     Obesity        PAST SURGICAL HISTORY:  Past Surgical History:   Procedure Laterality Date     SECTION      CHOLECYSTECTOMY      MO I&D VULVA/PERINEAL ABSCESS N/A 2022    Procedure: INCISION AND DRAINAGE (I&D) PERINEAL;  Surgeon: Eric Perkins MD;  Location: [facility];  Service: General    MO LAPS ESOPHAGOMYOTOMY W/FUNDOPLASTY IF PERFORMED N/A 2019    Procedure: LAPAROSCOPIC HELLER MYOTOMY AND PARTIAL FUNDOPLICATION, ALEX;  Surgeon: Seema Jackson MD;  Location:  MAIN OR;   Service: Thoracic    UPPER GASTROINTESTINAL ENDOSCOPY         FAMILY HISTORY:  Family History   Problem Relation Age of Onset    Asthma Mother     Hypertension Mother     Diabetes Mother     Hyperlipidemia Mother     Arthritis Mother     Cancer Father     Mental illness Father         PTSD    Depression Father        SOCIAL HISTORY:  Social History     Tobacco Use    Smoking status: Former     Current packs/day: 0.00     Average packs/day: 1 pack/day for 21.6 years (21.6 ttl pk-yrs)     Types: Cigarettes     Start date:      Quit date: 2016     Years since quittin.5    Smokeless tobacco: Never   Vaping Use    Vaping status: Never Used   Substance Use Topics    Alcohol use: Not Currently    Drug use: Never       MEDICATIONS:    Current Outpatient Medications:     glucosamine-chondroitin 500-400 MG tablet, Take 1 tablet by mouth 3 (three) times a day, Disp: , Rfl:     methylPREDNISolone 4 MG tablet therapy pack, Use as directed on package, Disp: 21 tablet, Rfl: 0    Tart Cherry 1200 MG CAPS, Take by mouth, Disp: , Rfl:     traZODone (DESYREL) 100 mg tablet, TAKE 1 TABLET (100 MG TOTAL) BY MOUTH DAILY AT BEDTIME, Disp: 90 tablet, Rfl: 0    Turmeric (QC Tumeric Complex) 500 MG CAPS, Take by mouth (Patient not taking: Reported on 10/22/2024), Disp: , Rfl:     ALLERGIES:  Allergies   Allergen Reactions    Promethazine Other (See Comments)     Restless legs           REVIEW OF SYSTEMS:  Review of Systems   Constitutional:  Negative for chills and fever.   HENT:  Negative for ear pain and sore throat.    Eyes:  Negative for pain and visual disturbance.   Respiratory:  Negative for cough and shortness of breath.    Cardiovascular:  Negative for chest pain and palpitations.   Gastrointestinal:  Negative for abdominal pain and vomiting.   Genitourinary:  Negative for dysuria and hematuria.   Musculoskeletal:  Negative for arthralgias and back pain.   Skin:  Negative for color change and rash.   Neurological:   "Negative for seizures and syncope.   All other systems reviewed and are negative.      VITALS:  There were no vitals filed for this visit.    LABS:      _____________________________________________________  PHYSICAL EXAMINATION:  General: well developed and well nourished, alert, oriented times 3, and appears comfortable  Psychiatric: Normal  HEENT: Normocephalic, Atraumatic Trachea Midline, No torticollis  Pulmonary: No audible wheezing or respiratory distress   Abdomen/GI: Non tender, non distended   Cardiovascular: No pitting edema, 2+ radial pulse   Skin: No masses, erythema, lacerations, fluctation, ulcerations  Neurovascular: Sensation Intact to the Median, Ulnar, Radial Nerve, Motor Intact to the Median, Ulnar, Radial Nerve, and Pulses Intact  Musculoskeletal: Normal, except as noted in detailed exam and in HPI.      MUSCULOSKELETAL EXAMINATION:    De Quervain's Tenosynovitis Exam:  right side    Positive tender to palpation over 1st dorsal extensor compartment   Negative palpable nodule  Positive crepitus over 1st dorsal extensor compartment   Positive Finkelstein's test    Positive pain with resisted abduction of the thumb     right CMC Exam:  No adduction contracture  No hyperextension deformity of MCP joint  Positive localized tenderness over radial and dorsal aspect of thumb (CMC joint)  Grind test is Negative for pain and Negative for crepitus  Metacarpal load shift test Negative  No triggering or tenderness over the A1 pulley  Positive pain with Finkelstein’s maneuver             ___________________________________________________  STUDIES REVIEWED:  I have personally reviewed and interpreted  AP lateral and oblique radiographs of right hand   which demonstrate Type 1 CMC degenerative changes        LABS REVIEWED:    HgA1c: No results found for: \"HGBA1C\"  BMP:   Lab Results   Component Value Date    CALCIUM 9.0 10/27/2023    K 4.8 10/27/2023    CO2 28 10/27/2023     10/27/2023    BUN 12 " "10/27/2023    CREATININE 0.73 10/27/2023               PROCEDURES PERFORMED:  Hand/upper extremity injection: R extensor compartment 1  Universal Protocol:  procedure performed by consultantConsent: Verbal consent obtained.  Risks and benefits: risks, benefits and alternatives were discussed  Consent given by: patient  Time out: Immediately prior to procedure a \"time out\" was called to verify the correct patient, procedure, equipment, support staff and site/side marked as required.  Timeout called at: 3/20/2025 1:35 PM.  Patient understanding: patient states understanding of the procedure being performed  Patient consent: the patient's understanding of the procedure matches consent given  Site marked: the operative site was marked  Patient identity confirmed: verbally with patient  Supporting Documentation  Indications: tendon swelling and pain   Procedure Details  Condition:de Quervain's tenosynovitis Site: R extensor compartment 1   Preparation: Patient was prepped and draped in the usual sterile fashion  Needle size: 25 G  Ultrasound guidance: no  Approach: volar  Medications administered: 1 mL lidocaine 1 %; 40 mg dexamethasone 100 mg/10 mL           _____________________________________________________      Scribe Attestation      I,:  Hill Tracy am acting as a scribe while in the presence of the attending physician.:       I,:  Bandar Appiah MD personally performed the services described in this documentation    as scribed in my presence.:                    "

## 2025-04-11 ENCOUNTER — EVALUATION (OUTPATIENT)
Dept: OCCUPATIONAL THERAPY | Facility: CLINIC | Age: 50
End: 2025-04-11
Payer: COMMERCIAL

## 2025-04-11 DIAGNOSIS — M65.4 DE QUERVAIN'S TENOSYNOVITIS, RIGHT: ICD-10-CM

## 2025-04-11 DIAGNOSIS — M18.11 PRIMARY OSTEOARTHRITIS OF FIRST CARPOMETACARPAL JOINT OF RIGHT HAND: ICD-10-CM

## 2025-04-11 PROCEDURE — 97165 OT EVAL LOW COMPLEX 30 MIN: CPT

## 2025-04-11 PROCEDURE — 97140 MANUAL THERAPY 1/> REGIONS: CPT

## 2025-04-11 PROCEDURE — 97110 THERAPEUTIC EXERCISES: CPT

## 2025-04-11 NOTE — HOME EXERCISE EDUCATION
Program_ID:[id number]   Access Code: [email or web address]  URL: [email or web address]  Date: 04-  Prepared By: Becca Sibley    Program Notes      Exercises      - Finkelstein Stretch - 3 x daily - 7 x weekly - 1 sets - 10 reps - 5-10 hold      - Thumb Opposition - 3 x daily - 7 x weekly - 1 sets - 10 reps - 5 hold      - Seated Isometric Thumb Extension with Manual Resistance - 1 x daily - 7 x weekly - 1 sets - 10 reps - 5 hold      - Seated Isometric Thumb Flexion - 1 x daily - 7 x weekly - 1 sets - 10 reps - 5 hold      - Seated Isometric Thumb Adduction - 1 x daily - 7 x weekly - 1 sets - 10 reps - 5 hold      - Seated Isometric Thumb MP Extension - 1 x daily - 7 x weekly - 1 sets - 10 reps - 5 hold      - Seated Isometric Thumb Abduction - 1 x daily - 7 x weekly - 1 sets - 10 reps - 5 hold      - Seated Wrist Radial Deviation with Dumbbell - 1 x daily - 7 x weekly - 1 sets - 10 reps - 3 hold

## 2025-04-11 NOTE — PROGRESS NOTES
OT Evaluation     Today's date: 2025  Patient name: Carolina Perez  : 1975  MRN: [id number]  Referring provider: Bandar Appiah MD  Dx:   Encounter Diagnosis     ICD-10-CM    1. De Quervain's tenosynovitis, right  M65.4 Ambulatory Referral to PT/OT Hand Therapy      2. Primary osteoarthritis of first carpometacarpal joint of right hand  M18.11 Ambulatory Referral to PT/OT Hand Therapy                     Assessment  Impairments: activity intolerance, impaired physical strength, lacks appropriate home exercise program, pain with function, weight-bearing intolerance and activity limitations  Functional limitations: difficulty opening jars/bottle caps, difficulty using computer/writing, unable to participate in leisure activities (crocheting)  Symptom irritability: high    Assessment details: Carolina is a 50 y.o. RHD female presenting with increased R thumb pain for past 6-7 months. Pt reports she received a steroid injection approximately 3 weeks ago, which worsened her pain. Pt has been compliant with wearing a thumb spica brace at night, as recommended by her MD.    Pt presents with increased pain at rest and with activity. AROM is WFL/WNL, but increased pain is noted at end range of thumb flexion to base of SF, wrist flexion, and radial/ulnar deviation. Pt has palpable tenderness over the 1st dorsal compartment and CMC joint. Provacative testing reveals a (+) Finkelstein's test and (+) CMC grind. Pt also presents with decreased R wrist and thumb strength, and decreased R  and pinch strength.     Functional use of the RUE is significantly limited due to these deficits. Pt would benefit from continued skilled OT to decrease pain, increase strength, and enable return to previous level of function. Pt was instructed in a HEP addressing gentle stretches, activity modifications, isometric and eccentric strengthening. Pt demo good understanding of HEP and instructed to stop exercises if pain worsens.        Barriers to therapy: Pt has a high insurance co-pay and is requesting continued OT 1x/week every 1-2 weeks.  Understanding of Dx/Px/POC: excellent     Prognosis: excellent    Goals  STGs (to be achieved in 4 weeks):  1. Pt will be independent and compliant with HEP  2. Pt will report 50% improvement in overall symptoms  3. Increase MMT by ½ grade in affected planes  4. Increase  strength by 25%        LTGs (to be achieved by discharge):  1. Pt will return to previous level of function with independent symptom management  2. 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 Increase R wrist and thumb MMT to 5/5  3. Increase R  strength to equal 75% of L  4. Improve FOTO score to predicted value            Plan  Patient would benefit from: skilled occupational therapy and OT eval  Planned modality interventions: cryotherapy, thermotherapy: hydrocollator packs and ultrasound    Planned therapy interventions: activity modification, IASTM, joint mobilization, kinesiology taping, neuromuscular re-education, patient/caregiver education, strengthening, stretching, therapeutic activities, therapeutic exercise, functional ROM exercises, graded activity, graded exercise and home exercise program    Frequency: 1x week  Duration in weeks: 6  Plan of Care beginning date: 4/11/2025  Plan of Care expiration date: 5/23/2025  Treatment plan discussed with: patient      Subjective Evaluation    History of Present Illness  Mechanism of injury: Pt reports onset of pain in Oct/Nov 2024. Pain was initially noted during functional use of RUE and gradually worsened over the past 6 months where pain lingers after activities.    Pt received a steroid injection on 3/20/25, which exacerbated her pain.          Recurrent probem    Quality of life: excellent    Patient Goals  Patient goals for therapy: decreased pain, increased strength, independence with ADLs/IADLs and increased motion  Patient goal: use R hand for  "crocheting  Pain  Current pain ratin  At best pain ratin  At worst pain ratin  Location: radial wrist, radiating distally into thumb  Quality: dull ache, radiating and throbbing (ocassional numbness, \"toothache\")  Relieving factors: support and heat (wearing thumb spica)  Aggravating factors: keyboarding (driving, writing, opening pill bottles)  Progression: worsening    Social Support  Lives with: spouse    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     Additional Palpation Details  (+) EPB, APL    Neurological Testing     Additional Neurological Details  Pt denies any numbness/tingling    Active Range of Motion     Right Wrist   Normal active range of motion    Right Thumb   Kapandji score: 10 degrees    Additional Active Range of Motion Details  (+) pain at end range of wrist flexion, RD/UD  Digital AROM is WNL    Strength/Myotome Testing     Left Wrist/Hand      (2nd hand position)     Trial 1: 53.5    Trial 2: 52.4    Trial 3: 45.1    Average: 50.33    Thumb Strength  Key/Lateral Pinch     Trial 1: 10.3    Trial 2: 9.6    Trial 3: 9.4    Average: 9.77  Tip/Two-Point Pinch     Trial 1: 7.8    Trial 2: 11    Trial 3: 8    Average: 8.93  Palmar/Three-Point Pinch     Trial 1: 8.4    Trial 2: 8.8    Trial 3: 8.3    Average: 8.5     Right Wrist/Hand   Wrist extension: 4+  Wrist flexion: 5  Radial deviation: 4+  Ulnar deviation: 5     (2nd hand position)     Trial 1: 30.2    Trial 2: 30.6    Trial 3: 33.5    Average: 31.43    Thumb Strength   Key/Lateral Pinch     Trial 1: 5.9    Trial 2: 9.1    Trial 3: 7.6    Average: 7.53  Tip/Two-Point Pinch     Trial 1: 8.1    Trial 2: 7.9    Trial 3: 7.1    Average: 7.7  Palmar/Three-Point Pinch     " "Trial 1: 7.1    Trial 2: 6.6    Trial 3: 7.6    Average: 7.1    Additional Strength Details  R thumb MMT:  Flexion = 5/5  Extension = 4/5  P. Abduction = 4+/5  Adduction = 5/5    Tests     Right Wrist/Hand   Positive CMC grind and Finkelstein's.   Negative CMC shoulder sign.       Flowsheet Rows      Flowsheet Row Most Recent Value   PT/OT G-Codes    Current Score 56   Projected Score 66               Precautions: Universal    POC expires Unit limit Auth Expiration date PT/OT/ST + Visit Limit?   5/23 BOMN PENDING 30 PCY OT/PT               MD: 5/8            Visit/Unit Tracking  AUTH Status:  Date 4/11              Pending auth Used 1               Remaining                    HEP: Access Code: [email or web address]     Manuals 4/11        IASTM 5'        Joint mobs         KT 1st DC 3'                 Neuro Re-Ed         Activity Mod Larger  pen, avoid static hold (phone), avoid pinch combined w/WF & RD/UD                                                              Ther Ex         Pt educ/HEP: Finkelstein's stretch, opp slides, thumb deneen, ecc RD        PROM 1:1         Extrinsic stretches Finkelstein stretch 5\" 5x        Ecc RD 1# 5x        Thumb deneen 5\" 3x all planes                                   Ther Activity                                                      Modalities                                "

## 2025-04-18 ENCOUNTER — APPOINTMENT (OUTPATIENT)
Dept: OCCUPATIONAL THERAPY | Facility: CLINIC | Age: 50
End: 2025-04-18
Attending: SURGERY
Payer: COMMERCIAL

## 2025-04-18 DIAGNOSIS — M18.11 PRIMARY OSTEOARTHRITIS OF FIRST CARPOMETACARPAL JOINT OF RIGHT HAND: ICD-10-CM

## 2025-04-18 DIAGNOSIS — M65.4 DE QUERVAIN'S TENOSYNOVITIS, RIGHT: Primary | ICD-10-CM

## 2025-04-18 NOTE — PROGRESS NOTES
"Daily Note     Today's date: 2025  Patient name: Carolina Perez  : 1975  MRN: [id number]  Referring provider: Bandar Appiah MD  Dx:   Encounter Diagnosis     ICD-10-CM    1. De Quervain's tenosynovitis, right  M65.4       2. Primary osteoarthritis of first carpometacarpal joint of right hand  M18.11                      Subjective: ***      Objective: See treatment diary below      Assessment: Tolerated treatment {Tolerated treatment :7814802238}. Patient {assessment:8734784392}      Plan: {PLAN:5128004762}     Precautions: Universal    POC expires Unit limit Auth Expiration date PT/OT/ST + Visit Limit?    BOMN PENDING 30 PCY OT/PT               MD:             Visit/Unit Tracking  AUTH Status:  Date               Pending auth Used 1               Remaining                    HEP: Access Code: [email or web address]     Manuals         IASTM 5'        Joint mobs         KT 1st DC 3'                 Neuro Re-Ed         Activity Mod Larger  pen, avoid static hold (phone), avoid pinch combined w/WF & RD/UD                                                              Ther Ex         Pt educ/HEP: Finkelstein's stretch, opp slides, thumb deneen, ecc RD        PROM 1:1         Extrinsic stretches Finkelstein stretch 5\" 5x        Ecc RD 1# 5x        Thumb deneen 5\" 3x all planes                                   Ther Activity                                                      Modalities                                "

## 2025-05-02 ENCOUNTER — OFFICE VISIT (OUTPATIENT)
Dept: OCCUPATIONAL THERAPY | Facility: CLINIC | Age: 50
End: 2025-05-02
Attending: SURGERY
Payer: COMMERCIAL

## 2025-05-02 DIAGNOSIS — M65.4 DE QUERVAIN'S TENOSYNOVITIS, RIGHT: Primary | ICD-10-CM

## 2025-05-02 DIAGNOSIS — M18.11 PRIMARY OSTEOARTHRITIS OF FIRST CARPOMETACARPAL JOINT OF RIGHT HAND: ICD-10-CM

## 2025-05-02 PROCEDURE — 97110 THERAPEUTIC EXERCISES: CPT

## 2025-05-02 PROCEDURE — 97140 MANUAL THERAPY 1/> REGIONS: CPT

## 2025-05-02 NOTE — PROGRESS NOTES
"Daily Note     Today's date: 2025  Patient name: Carolina Perez  : 1975  MRN: [id number]  Referring provider: Bandar Appiah MD  Dx:   Encounter Diagnosis     ICD-10-CM    1. De Quervain's tenosynovitis, right  M65.4       2. Primary osteoarthritis of first carpometacarpal joint of right hand  M18.11                      Subjective: \"It's a lot better.\"      Objective: See treatment diary below      Assessment: Tolerated treatment well. Pt able to tolerate upgraded exercises without exacerbation of pain. Pt demo good understanding of upgraded HEP. Fatigue reported at end of treatment session. Patient would benefit from continued OT      Plan: Continue per plan of care.      Precautions: Universal    POC expires Unit limit Auth Expiration date PT/OT/ST + Visit Limit?    BOMN 10/7/25 30 PCY OT/PT               MD:             Visit/Unit Tracking  AUTH Status:  Date              Visits auth'd = 9 Used 1 2              Remaining  8 7                 HEP: Access Code: [email or web address]     Manuals        IASTM 5' 8'       Joint mobs  2' CMC       KT 1st DC 3'                 Neuro Re-Ed         Activity Mod Larger  pen, avoid static hold (phone), avoid pinch combined w/WF & RD/UD                                                              Ther Ex  30'       Pt educ/HEP: Finkelstein's stretch, opp slides, thumb deneen, ecc RD Eccentric thumb & wrist ext       PROM 1:1  Wrist, thumb 5'       Extrinsic stretches Finkelstein stretch 5\" 5x P: WF, UD 5\" 10x  A: 1st DC stretch 5\" 5x       Ecc RD 1# 5x 1# 2x10       Thumb deneen 5\" 3x all planes 5\" 10x all planes       Ecc WE  1# 2x10       Ecc thumb ext  RB on TB 3x10                Ther Activity         Translation/ dexterity  Key pegs 1x                                           Modalities         MHP  5' pre                     "

## 2025-05-15 ENCOUNTER — OFFICE VISIT (OUTPATIENT)
Dept: OBGYN CLINIC | Facility: CLINIC | Age: 50
End: 2025-05-15
Payer: COMMERCIAL

## 2025-05-15 VITALS — HEIGHT: 62 IN | WEIGHT: 208 LBS | BODY MASS INDEX: 38.28 KG/M2

## 2025-05-15 DIAGNOSIS — M65.4 DE QUERVAIN'S TENOSYNOVITIS, RIGHT: Primary | ICD-10-CM

## 2025-05-15 PROCEDURE — 20550 NJX 1 TENDON SHEATH/LIGAMENT: CPT | Performed by: SURGERY

## 2025-05-15 PROCEDURE — 99213 OFFICE O/P EST LOW 20 MIN: CPT | Performed by: SURGERY

## 2025-05-15 RX ORDER — DEXAMETHASONE SODIUM PHOSPHATE 10 MG/ML
40 INJECTION, SOLUTION INTRAMUSCULAR; INTRAVENOUS
Status: COMPLETED | OUTPATIENT
Start: 2025-05-15 | End: 2025-05-15

## 2025-05-15 RX ORDER — LIDOCAINE HYDROCHLORIDE 10 MG/ML
1 INJECTION, SOLUTION INFILTRATION; PERINEURAL
Status: COMPLETED | OUTPATIENT
Start: 2025-05-15 | End: 2025-05-15

## 2025-05-15 RX ADMIN — DEXAMETHASONE SODIUM PHOSPHATE 40 MG: 10 INJECTION, SOLUTION INTRAMUSCULAR; INTRAVENOUS at 08:15

## 2025-05-15 RX ADMIN — LIDOCAINE HYDROCHLORIDE 1 ML: 10 INJECTION, SOLUTION INFILTRATION; PERINEURAL at 08:15

## 2025-05-15 NOTE — ASSESSMENT & PLAN NOTE
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 thyroid storm, unspecified thyrotoxicosis type        Ischemic heart disease        Hyperlipidemia LDL goal <100        BENIGN HYPERTENSION        Benign non-nodular prostatic hyperplasia with lower urinary tract symptoms        Spinal stenosis of lumbar region with neurogenic claudication          Care Instructions    Tremors are likely to be from Symbicort              Follow-ups after your visit        Your next 10 appointments already scheduled     Jun 01, 2017 10:00 AM CDT   FULL PULMONARY FUNCTION with  PFL B   [facility] ([facility])    [address] [place] [id number] [phone]            Jun 01, 2017 11:00 AM CDT   (Arrive by 10:45 AM)   New Patient Visit with Khoa Handy MD   [facility] ([facility])    [address] [place] [id number] [phone]              Who to contact     If you have questions or need follow up information about today's clinic visit or your schedule please contact [facility] directly at [phone].  Normal or non-critical lab and imaging results will be communicated to you by MyChart, letter or phone within 4 business days after the clinic has received the results. If you do not hear from us within 7 days, please contact the clinic through MyChart or phone. If you have a critical or abnormal lab result, we will notify you by phone as soon as possible.  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 Please allow 3 business days for your refill to be completed.          Additional Information About Your Visit        [email or web address] Information     [employer] gives you secure access to your electronic health record. If you see a primary care provider, you can also send messages to your care team and make appointments. If you have questions, please call your primary care clinic.  If you do not have a primary care provider, please call [phone] and they will assist you.        Care EveryWhere ID     This is your Care EveryWhere ID. This could be used by other organizations to access your [place] medical records  [id number]        Your Vitals Were     Pulse Temperature Respirations Height Pulse Oximetry BMI (Body Mass Index)    59 97.6  F (36.4  C) (Tympanic) 18 5' 10\" (1.778 m) 94% 25.97 kg/m2       Blood Pressure from Last 3 Encounters:   05/26/17 130/73   02/27/17 125/68   01/18/17 124/60    Weight from Last 3 Encounters:   05/26/17 181 lb (82.1 kg)   02/27/17 185 lb (83.9 kg)   01/18/17 185 lb (83.9 kg)              We Performed the Following     CBC with platelets     Comprehensive metabolic panel     Lipid panel reflex to direct LDL     T3, Free     T4 free     TSH with free T4 reflex          Where to get your medicines      These medications were sent to [facility] [address] [address]     Phone:  [phone]     alfuzosin 10 MG 24 hr tablet    lisinopril 40 MG tablet         Some of these will need a paper prescription and others can be bought over the counter.  Ask your nurse if you have questions.     Bring a paper prescription for each of these medications     HYDROcodone-acetaminophen 5-325 MG per tablet          Primary Care Provider Office Phone # Fax #    Yaneli Dozier [phone]       [facility] [address] [facility] [address]  [facility] [address]        Thank you!     Thank you for choosing [facility]  for your care. Our goal is always to provide you with excellent care. Hearing back from our patients is one way we can continue to improve our services. Please take a few minutes to complete the written survey that you may receive in the mail after your visit with us. Thank you!             Your Updated Medication List - Protect others around you: Learn how to safely use, store and throw away your medicines at www.disposemymeds.org.          This list is accurate as of: 5/26/17  9:54 AM.  Always use your most recent med list.                   Brand Name Dispense Instructions for use    albuterol 108 (90 BASE) MCG/ACT Inhaler    albuterol    25.5 Inhaler    INHALE 2 PUFFS INTO THE LUNGS EVERY 6 HOURS AS NEEDED FOR SHORTNESS OF BREATH / DYSPNEA OR WHEEZING       alfuzosin 10 MG 24 hr tablet    UROXATRAL    90 tablet    Take 1 tablet (10 mg) by mouth daily After a meal       aspirin 81 MG tablet      Take 1 tablet by mouth 2 times daily       budesonide-formoterol 160-4.5 MCG/ACT Inhaler    SYMBICORT    3 Inhaler    Inhale 2 puffs into the lungs 2 times daily       carisoprodol 350 MG tablet    SOMA    30 tablet    TAKE 1 TABLET BY MOUTH 3 TIMES DAILY AND 1 TABLET AT BEDTIME       fish oil-omega-3 fatty acids 1000 MG capsule      take one tablet twice daily       HYDROcodone-acetaminophen 5-325 MG per tablet    NORCO    180 tablet    Take 1 tablet by mouth every 4 hours as needed       lisinopril 40 MG tablet    PRINIVIL/ZESTRIL    90 tablet    Take 1 tablet (40 mg) by mouth daily       methimazole 5 MG tablet    TAPAZOLE     Take 5 mg by mouth every other day       metoprolol 25 MG 24 hr tablet    TOPROL-XL    90 tablet    Take 1 tablet (25 mg) by mouth daily TAKE 1/2 TABLETS (12.5 MG) BY MOUTH DAILY       MULTIVITAMIN PO      Take by mouth daily       nitroglycerin 0.4 MG sublingual tablet    NITROSTAT    30  tablet    Place 1 tablet (0.4 mg) under the tongue See Admin Instructions for chest pain       rosuvastatin 10 MG tablet    CRESTOR    90 tablet    TAKE 1 TABLET (10 MG) BY MOUTH DAILY       triamcinolone 0.1 % lotion    KENALOG    60 mL    Apply sparingly to affected area three times daily as needed.       umeclidinium 62.5 MCG/INH oral inhaler    INCRUSE ELLIPTA    1 Inhaler    Inhale 1 puff into the lungs daily

## 2025-05-15 NOTE — PROGRESS NOTES
Bandar Appiah M.D.  Attending, Orthopaedic Surgery  Hand, Wrist, and Elbow Surgery  [facility]      ORTHOPAEDIC HAND, WRIST, AND ELBOW OFFICE  VISIT       ASSESSMENT/PLAN:        Assessment & Plan  De Quervain's tenosynovitis, right  Patient did have some improvement eventually with injection and therapy but her symptoms have begun to return over the last few weeks  Discussed next steps in treatment including repeat CSI and continued conservative management, or surgical release  Patient elected to proceed with repeat injection today which was done without complication  She should continue therapy and bracing over the next 6 weeks  If no improvement will discuss surgical release at that time   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 We engaged in the shared decision-making process and treatment options were discussed at length with the patient. Surgical and conservative management discussed today along with risks and benefits.      Follow Up:  Return in about 6 weeks (around 6/26/2025) for Recheck.    To Do Next Visit:  Re-evaluation of current issue      ____________________________________________________________________________________________________________________________________________      CHIEF COMPLAINT:  Chief Complaint   Patient presents with    Follow-up     Patient had an injection in the right wrist that only helped about three weeks        SUBJECTIVE:  Carolina Perez is a 50 y.o. year old RHD female who presents for follow up evaluation of the right wrist. She underwent a first dorsal compartment injection at the time which caused pain for about 3 weeks but eventually it improved after some therapy. Over the last week or so, however, she  feels it has been getting worse and increasingly irritable.      I have personally reviewed all the relevant PMH, PSH, SH, FH, Medications and allergies      PAST MEDICAL HISTORY:  Past Medical History:   Diagnosis Date    Achalasia 2006    Allergic     Anxiety     Depression     Obesity        PAST SURGICAL HISTORY:  Past Surgical History:   Procedure Laterality Date     SECTION      CHOLECYSTECTOMY      NV I&D VULVA/PERINEAL ABSCESS N/A 2022    Procedure: INCISION AND DRAINAGE (I&D) PERINEAL;  Surgeon: Eric Perkins MD;  Location: [facility];  Service: General    NV LAPS ESOPHAGOMYOTOMY W/FUNDOPLASTY IF PERFORMED N/A 2019    Procedure: LAPAROSCOPIC HELLER MYOTOMY AND PARTIAL FUNDOPLICATION, ALEX;  Surgeon: Seema Jackson MD;  Location:  MAIN OR;  Service: Thoracic    UPPER GASTROINTESTINAL ENDOSCOPY         FAMILY HISTORY:  Family History   Problem Relation Age of Onset    Asthma Mother     Hypertension Mother     Diabetes Mother     Hyperlipidemia Mother     Arthritis Mother     Cancer Father     Mental illness Father         PTSD    Depression Father        SOCIAL HISTORY:  Social History[1]    MEDICATIONS:  Current Medications[2]    ALLERGIES:  Allergies[3]        REVIEW OF SYSTEMS:  Review of Systems   Constitutional:  Negative for chills and fever.   HENT:  Negative for ear pain and sore throat.    Eyes:  Negative for pain and visual disturbance.   Respiratory:  Negative for cough and shortness of breath.    Cardiovascular:  Negative for chest pain and palpitations.   Gastrointestinal:  Negative for abdominal pain and vomiting.   Genitourinary:  Negative for dysuria and hematuria.   Musculoskeletal:  Positive for arthralgias. Negative for back pain.   Skin:  Negative for color change and rash.   Neurological:  Negative for seizures and syncope.   All other systems reviewed and are negative.      VITALS:  There were no vitals filed for this  "visit.    LABS:      _____________________________________________________  PHYSICAL EXAMINATION:  General: well developed and well nourished, alert, oriented times 3, and appears comfortable  Psychiatric: Normal  HEENT: Normocephalic, Atraumatic Trachea Midline, No torticollis  Pulmonary: No audible wheezing or respiratory distress   Abdomen/GI: Non tender, non distended   Cardiovascular: No pitting edema, 2+ radial pulse   Skin: No masses, erythema, lacerations, fluctation, ulcerations  Neurovascular: Sensation Intact to the Median, Ulnar, Radial Nerve, Motor Intact to the Median, Ulnar, Radial Nerve, and Pulses Intact  Musculoskeletal: Normal, except as noted in detailed exam and in HPI.      MUSCULOSKELETAL EXAMINATION:  De Quervain's Tenosynovitis Exam:  right side    Positive tender to palpation over 1st dorsal extensor compartment   Negative palpable nodule  Negative crepitus over 1st dorsal extensor compartment   Positive Finkelstein's test    Positive pain with resisted abduction of the thumb      ___________________________________________________  STUDIES REVIEWED:  No new imaging to review        LABS REVIEWED:    HgA1c: No results found for: \"HGBA1C\"  BMP:   Lab Results   Component Value Date    CALCIUM 9.0 10/27/2023    K 4.8 10/27/2023    CO2 28 10/27/2023     10/27/2023    BUN 12 10/27/2023    CREATININE 0.73 10/27/2023               PROCEDURES PERFORMED:  Hand/upper extremity injection: R extensor compartment 1    Universal Protocol:  procedure performed by consultantConsent: Verbal consent obtained  Risks and benefits: risks, benefits and alternatives were discussed  Consent given by: patient  Time out: Immediately prior to procedure a \"time out\" was called to verify the correct patient, procedure, equipment, support staff and site/side marked as required.  Patient understanding: patient states understanding of the procedure being performed  Site marked: the operative site was " marked  Required items: required blood products, implants, devices, and special equipment available  Patient identity confirmed: verbally with patient  Supporting Documentation  Indications: pain and therapeutic   Procedure Details  Condition:de Quervain's tenosynovitis Site: R extensor compartment 1   Preparation: Patient was prepped and draped in the usual sterile fashion  Needle size: 25 G  Ultrasound guidance: no  Approach: radial  Medications administered: 1 mL lidocaine 1 %; 40 mg dexamethasone 100 mg/10 mL  Patient tolerance: patient tolerated the procedure well with no immediate complications  Dressing:  Sterile dressing applied         -    _____________________________________________________      Scribe Attestation      I,:  Sushila Muro PA-C am acting as a scribe while in the presence of the attending physician.:       I,:  Bandar Appiah MD personally performed the services described in this documentation    as scribed in my presence.:                          [1]   Social History  Tobacco Use    Smoking status: Former     Current packs/day: 0.00     Average packs/day: 1 pack/day for 21.6 years (21.6 ttl pk-yrs)     Types: Cigarettes     Start date:      Quit date: 2016     Years since quittin.7    Smokeless tobacco: Never   Vaping Use    Vaping status: Never Used   Substance Use Topics    Alcohol use: Not Currently    Drug use: Never   [2]   Current Outpatient Medications:     glucosamine-chondroitin 500-400 MG tablet, Take 1 tablet by mouth 3 (three) times a day, Disp: , Rfl:     methylPREDNISolone 4 MG tablet therapy pack, Use as directed on package, Disp: 21 tablet, Rfl: 0    Tart Cherry 1200 MG CAPS, Take by mouth, Disp: , Rfl:     traZODone (DESYREL) 100 mg tablet, TAKE 1 TABLET (100 MG TOTAL) BY MOUTH DAILY AT BEDTIME, Disp: 90 tablet, Rfl: 0    Turmeric (QC Tumeric Complex) 500 MG CAPS, Take by mouth (Patient not taking: Reported on 10/22/2024), Disp: , Rfl:   [3]    Allergies  Allergen Reactions    Promethazine Other (See Comments)     Restless legs

## 2025-05-16 ENCOUNTER — OFFICE VISIT (OUTPATIENT)
Dept: OCCUPATIONAL THERAPY | Facility: CLINIC | Age: 50
End: 2025-05-16
Attending: SURGERY
Payer: COMMERCIAL

## 2025-05-16 DIAGNOSIS — M65.4 DE QUERVAIN'S TENOSYNOVITIS, RIGHT: Primary | ICD-10-CM

## 2025-05-16 DIAGNOSIS — M18.11 PRIMARY OSTEOARTHRITIS OF FIRST CARPOMETACARPAL JOINT OF RIGHT HAND: ICD-10-CM

## 2025-05-16 NOTE — PROGRESS NOTES
"Daily Note     Today's date: 2025  Patient name: Carolina Perez  : 1975  MRN: [id number]  Referring provider: Bandar Appiah MD  Dx:   Encounter Diagnosis     ICD-10-CM    1. De Quervain's tenosynovitis, right  M65.4       2. Primary osteoarthritis of first carpometacarpal joint of right hand  M18.11                      Subjective: \"I got another shot yesterday.\"      Objective: See treatment diary below      Assessment: Pt was not seen for treatment today secondary to receiving her 2nd CSI 24 hours ago. Today's appointment rescheduled to .      Plan: Continue per plan of care. Progress Note next visit     Precautions: Universal    POC expires Unit limit Auth Expiration date PT/OT/ST + Visit Limit?    BOMN 10/7/25 30 PCY OT/PT               MD:             Visit/Unit Tracking  AUTH Status:  Date              Visits auth'd = 9 Used 1 2              Remaining  8 7                 HEP: Access Code: [email or web address]     Manuals  - hold Tx today      IASTM 5' 8'       Joint mobs  2' CMC       KT 1st DC 3'                 Neuro Re-Ed         Activity Mod Larger  pen, avoid static hold (phone), avoid pinch combined w/WF & RD/UD                                                              Ther Ex  30'       Pt educ/HEP: Finkelstein's stretch, opp slides, thumb deneen, ecc RD Eccentric thumb & wrist ext       PROM 1:1  Wrist, thumb 5'       Extrinsic stretches Finkelstein stretch 5\" 5x P: WF, UD 5\" 10x  A: 1st DC stretch 5\" 5x       Ecc RD 1# 5x 1# 2x10       Thumb deneen 5\" 3x all planes 5\" 10x all planes       Ecc WE  1# 2x10       Ecc thumb ext  RB on TB 3x10                Ther Activity         Translation/ dexterity  Key pegs 1x                                           Modalities         MHP  5' pre                     "

## 2025-05-22 ENCOUNTER — OFFICE VISIT (OUTPATIENT)
Dept: OCCUPATIONAL THERAPY | Facility: CLINIC | Age: 50
End: 2025-05-22
Attending: SURGERY
Payer: COMMERCIAL

## 2025-05-22 DIAGNOSIS — M18.11 PRIMARY OSTEOARTHRITIS OF FIRST CARPOMETACARPAL JOINT OF RIGHT HAND: ICD-10-CM

## 2025-05-22 DIAGNOSIS — M65.4 DE QUERVAIN'S TENOSYNOVITIS, RIGHT: Primary | ICD-10-CM

## 2025-05-22 PROCEDURE — 97140 MANUAL THERAPY 1/> REGIONS: CPT

## 2025-05-22 PROCEDURE — 97110 THERAPEUTIC EXERCISES: CPT

## 2025-05-22 NOTE — PROGRESS NOTES
OT Re-Evaluation     Today's date: 2025  Patient name: Carolina Perez  : 1975  MRN: [id number]  Referring provider: Bandar Appiah MD  Dx:   Encounter Diagnosis     ICD-10-CM    1. De Quervain's tenosynovitis, right  M65.4       2. Primary osteoarthritis of first carpometacarpal joint of right hand  M18.11                      Assessment  Impairments: activity intolerance, impaired physical strength, lacks appropriate home exercise program, pain with function, weight-bearing intolerance and activity limitations  Functional limitations: difficulty opening jars/bottle caps, difficulty using computer/writing, unable to participate in leisure activities (crocheting)  Symptom irritability: high    Assessment details: Carolina is a 50 y.o. RHD female presenting with increased R thumb pain for past 6-7 months. Pt reports she received a steroid injection approximately 3 weeks ago, which worsened her pain. Pt has been compliant with wearing a thumb spica brace at night, as recommended by her MD.    Pt presents with increased pain at rest and with activity. AROM is WFL/WNL, but increased pain is noted at end range of thumb flexion to base of SF, wrist flexion, and radial/ulnar deviation. Pt has palpable tenderness over the 1st dorsal compartment and CMC joint. Provacative testing reveals a (+) Finkelstein's test and (+) CMC grind. Pt also presents with decreased R wrist and thumb strength, and decreased R  and pinch strength.     Functional use of the RUE is significantly limited due to these deficits. Pt would benefit from continued skilled OT to decrease pain, increase strength, and enable return to previous level of function. Pt was instructed in a HEP addressing gentle stretches, activity modifications, isometric and eccentric strengthening. Pt demo good understanding of HEP and instructed to stop exercises if pain worsens.     25 - Re-evaluation:  Carolina has been seen for 3 OT visits. OT treatments  have been limited secondary to high insurance co-pay. She received her secondary CSI 1 week ago with symptom relief reported for 2-3 days.   Pt seen for RE today to renew her POC. She reports decreased pain overall, but continues to complain of moderate pain over the 1st dorsal compartment which limits functional use of the RUE. Pt presents with increased  and lateral pinch strength. Pt would benefit from continued skilled OT. Pt is in agreement with plan for continued care.      Barriers to therapy: Pt has a high insurance co-pay and is requesting continued OT 1x/week every 1-2 weeks.  Understanding of Dx/Px/POC: excellent     Prognosis: good    Goals  STGs (to be achieved in 4 weeks):  1. Pt will be independent and compliant with HEP - MET  2. Pt will report 50% improvement in overall symptoms - NOT MET  3. Increase MMT by ½ grade in affected planes - NOT MET  4. Increase  strength by 25% - MET       LTGs (to be achieved by discharge):  1. Pt will return to previous level of function with independent symptom management - NOT MET  2. Pt will gain sufficient strength to open pill bottles at work - NOT MET  3. Increase R wrist and thumb MMT to 5/5 - NOT MET  3. Increase R  strength to equal 75% of L - NOT MET  4. Improve FOTO score to predicted value - NOT MET    5/22/25 - Updated goals:  STGs (to be achieved in 4 weeks):  1. Pt will be independent with joint protection strategies  2. Pt will report 50% improvement in overall symptoms   3. Increase MMT by ½ grade in affected planes    LTGs (to be achieved by discharge):  1. Pt will return to previous level of function with independent symptom management   2. Pt will gain sufficient strength to open pill bottles at work   3. Increase R wrist and thumb MMT to 5/5   3. Increase R  strength to equal 75% of L   4. Improve FOTO score to predicted value         Plan  Patient would benefit from: skilled occupational therapy  Planned modality interventions:  "cryotherapy, thermotherapy: hydrocollator packs and ultrasound    Planned therapy interventions: activity modification, IASTM, joint mobilization, kinesiology taping, neuromuscular re-education, patient/caregiver education, strengthening, stretching, therapeutic activities, therapeutic exercise, functional ROM exercises, graded activity, graded exercise and home exercise program    Frequency: 1x week  Duration in weeks: 6  Plan of Care beginning date: 2025  Plan of Care expiration date: 7/3/2025  Treatment plan discussed with: patient      Subjective Evaluation    History of Present Illness  Mechanism of injury: Pt reports onset of pain in Oct/2024. Pain was initially noted during functional use of RUE and gradually worsened over the past 6 months where pain lingers after activities.    Pt received a steroid injection on 3/20/25, which exacerbated her pain.          Recurrent probem    Quality of life: excellent    Patient Goals  Patient goals for therapy: decreased pain, increased strength, independence with ADLs/IADLs and increased motion  Patient goal: use R hand for crocheting  Pain  Current pain rating: 3  At best pain ratin  At worst pain ratin  Location: radial wrist, radiating distally into thumb  Quality: dull ache, radiating and throbbing (ocassional numbness, \"toothache\")  Relieving factors: support and heat (wearing thumb spica)  Aggravating factors: keyboarding (driving, writing, opening pill bottles)  Progression: improved    Social Support  Lives with: spouse    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     Additional Palpation Details  (+) EPB, APL    Neurological Testing     Additional Neurological Details  Pt denies any " "numbness/tingling    Active Range of Motion     Right Wrist   Normal active range of motion    Right Thumb   Kapandji score: 10 degrees    Additional Active Range of Motion Details  (+) pain at end range of wrist flexion, RD/UD  Digital AROM is WNL    Strength/Myotome Testing     Left Wrist/Hand      (2nd hand position)     Trial 1: 53.5    Trial 2: 52.4    Trial 3: 45.1    Average: 50.33    Thumb Strength  Key/Lateral Pinch     Trial 1: 10.3    Trial 2: 9.6    Trial 3: 9.4    Average: 9.77  Tip/Two-Point Pinch     Trial 1: 7.8    Trial 2: 11    Trial 3: 8    Average: 8.93  Palmar/Three-Point Pinch     Trial 1: 8.4    Trial 2: 8.8    Trial 3: 8.3    Average: 8.5     Right Wrist/Hand   Wrist extension: 4+  Wrist flexion: 5  Radial deviation: 4+  Ulnar deviation: 5     (2nd hand position)     Trial 1: 48.7    Thumb Strength   Key/Lateral Pinch     Trial 1: 8.6  Tip/Two-Point Pinch     Trial 1: 6.2  Palmar/Three-Point Pinch     Trial 1: 7.1    Additional Strength Details  R thumb MMT:  Flexion = 5/5  Extension = 4/5  P. Abduction = 4+/5  Adduction = 5/5    Tests     Right Wrist/Hand   Positive CMC grind and Finkelstein's.   Negative CMC shoulder sign.                  Daily Note     Today's date: 2025  Patient name: Carolina Perez  : 1975  MRN: [id number]  Referring provider: Bandar Appiah MD  Dx:   Encounter Diagnosis     ICD-10-CM    1. De Quervain's tenosynovitis, right  M65.4       2. Primary osteoarthritis of first carpometacarpal joint of right hand  M18.11                      Subjective: \"It's bothering me today.\"      Objective: See RE for details. See treatment diary below      Assessment: Pt seen for RE today to renew POC. Pt reports decreased pain overall, but continues to complain of moderate pain over the 1st dorsal compartment which limits functional use of the RUE. Pt presents with increased  and lateral pinch strength. Pt would benefit from continued skilled OT. Pt is in " "agreement with plan for continued care.      Plan: Continue per plan of care.      Precautions: Universal    POC expires Unit limit Auth Expiration date PT/OT/ST + Visit Limit?   5/23 BOMN 10/7/25 30 PCY OT/PT               MD: 5/8            Visit/Unit Tracking  AUTH Status:  Date 4/11 5/2 5/22            Visits auth'd = 9 Used 1 2 3             Remaining  8 7 6                HEP: Access Code: [email or web address]     Manuals 4/11 5/2 5/16 - hold Tx today 5/22     IASTM 5' 8'  10' 1st DC, ECRL/ECRB     Joint mobs  2' CMC  Thumb MP, CMC joint distraction 5'     KT 1st DC 3'   1st DC 3'     TP compression    1st web space 30\" 4x              Neuro Re-Ed         Activity Mod Larger  pen, avoid static hold (phone), avoid pinch combined w/WF & RD/UD                                                              Ther Ex  30'  15'     Pt educ/HEP: Finkelstein's stretch, opp slides, thumb deneen, ecc RD Eccentric thumb & wrist ext       PROM 1:1  Wrist, thumb 5'  Wrist, thumb 5'     Extrinsic stretches Finkelstein stretch 5\" 5x P: WF, UD 5\" 10x  A: 1st DC stretch 5\" 5x  P: WF, UD 5\" 10x  A: ext mm, 1st DC stretch 5\" 10x ea     Ecc RD 1# 5x 1# 2x10  hold     Thumb deneen 5\" 3x all planes 5\" 10x all planes  5\" 10x all planes, <50%     Ecc WE  1# 2x10  hold     Ecc thumb ext  RB on TB 3x10  hold     AROM - thumb    Opposition, opp slides 10x              Ther Activity         Translation/ dexterity  Key pegs 1x  hold                                         Modalities         MHP  5' pre  5' pre                   "

## 2025-05-30 ENCOUNTER — APPOINTMENT (OUTPATIENT)
Dept: OCCUPATIONAL THERAPY | Facility: CLINIC | Age: 50
End: 2025-05-30
Attending: SURGERY
Payer: COMMERCIAL

## 2025-06-02 ENCOUNTER — VBI (OUTPATIENT)
Dept: ADMINISTRATIVE | Facility: OTHER | Age: 50
End: 2025-06-02

## 2025-06-02 NOTE — TELEPHONE ENCOUNTER
06/02/25 12:37 PM     Chart reviewed for CRC: Colonoscopy ; nothing is submitted to the patient's insurance at this time.     Hugo Savage MA   [facility]

## 2025-06-06 DIAGNOSIS — F51.05 INSOMNIA DUE TO OTHER MENTAL DISORDER: ICD-10-CM

## 2025-06-06 DIAGNOSIS — F99 INSOMNIA DUE TO OTHER MENTAL DISORDER: ICD-10-CM

## 2025-06-08 RX ORDER — TRAZODONE HYDROCHLORIDE 100 MG/1
100 TABLET ORAL
Qty: 90 TABLET | Refills: 0 | Status: SHIPPED | OUTPATIENT
Start: 2025-06-08

## 2025-06-13 ENCOUNTER — OFFICE VISIT (OUTPATIENT)
Dept: OCCUPATIONAL THERAPY | Facility: CLINIC | Age: 50
End: 2025-06-13
Attending: SURGERY
Payer: COMMERCIAL

## 2025-06-13 DIAGNOSIS — M18.11 PRIMARY OSTEOARTHRITIS OF FIRST CARPOMETACARPAL JOINT OF RIGHT HAND: ICD-10-CM

## 2025-06-13 DIAGNOSIS — M65.4 DE QUERVAIN'S TENOSYNOVITIS, RIGHT: Primary | ICD-10-CM

## 2025-06-13 PROCEDURE — 97110 THERAPEUTIC EXERCISES: CPT

## 2025-06-13 PROCEDURE — 97140 MANUAL THERAPY 1/> REGIONS: CPT

## 2025-06-13 NOTE — PROGRESS NOTES
"Daily Note/Discharge Note    Today's date: 2025  Patient name: Carolina Perez  : 1975  MRN: [id number]  Referring provider: Bandar Appiah MD  Dx:   Encounter Diagnosis     ICD-10-CM    1. De Quervain's tenosynovitis, right  M65.4       2. Primary osteoarthritis of first carpometacarpal joint of right hand  M18.11                      Subjective: \"I'm going back to the Dr to discuss possible surgery.\"      Objective: See treatment diary below      Assessment: Tolerated treatment well. Patient is requesting discharge from OT at this time. She will follow up with hand surgeon to discuss further medical intervention. Pt demo good understanding of HEP and orthotic use.      Plan: D/C from OT     Precautions: Universal    POC expires Unit limit Auth Expiration date PT/OT/ST + Visit Limit?   7/3 BOMN 10/7/25 30 PCY OT/PT               MD:             Visit/Unit Tracking  AUTH Status:  Date            Visits auth'd = 9 Used 1 2 3 4            Remaining  8 7 6 5               HEP: Access Code: [email or web address]     Manuals  - hold Tx today     IASTM 5' 8'  10' 1st DC, ECRL/ECRB 10' 1st DC, ECRL/ECRB    Joint mobs  2' CMC  Thumb MP, CMC joint distraction 5' Thumb MP, CMC joint distraction 5'    Self mobs (butterfly/chest mob)    KT 1st DC 3'   1st DC 3'     TP compression    1st web space 30\" 4x 1st web space 30\" 3x, chip clip 30\" 1x    splinting     Demo use of comfort cool during the day; thumb spica PM    Neuro Re-Ed         Activity Mod Larger  pen, avoid static hold (phone), avoid pinch combined w/WF & RD/UD    reviewed                                                          Ther Ex  30'  15' 10'    Pt educ/HEP: Finkelstein's stretch, opp slides, thumb deneen, ecc RD Eccentric thumb & wrist ext   Reviewed    PROM 1:1  Wrist, thumb 5'  Wrist, thumb 5' Wrist, thumb 5'    Extrinsic stretches Finkelstein stretch 5\" 5x P: WF, UD 5\" 10x  A: 1st DC stretch 5\" 5x  P: WF, UD " "5\" 10x  A: ext mm, 1st DC stretch 5\" 10x ea P: WF, UD 5\" 10x  A: ext mm, 1st DC stretch 5\" 10x ea    Ecc RD 1# 5x 1# 2x10  hold     Thumb deneen 5\" 3x all planes 5\" 10x all planes  5\" 10x all planes, <50%     Ecc WE  1# 2x10  hold     Ecc thumb ext  RB on TB 3x10  hold     AROM - thumb    Opposition, opp slides 10x Opposition, opp slides 10x             Ther Activity         Translation/ dexterity  Key pegs 1x  hold                                         Modalities         MHP  5' pre  5' pre 5' pre                    " "NA/DC  4. Improve FOTO score to predicted value - MET         Plan    Treatment plan discussed with: patient  Plan details: D/C FROM OT      Subjective Evaluation    History of Present Illness  Mechanism of injury: Pt reports onset of pain in Oct/2024. Pain was initially noted during functional use of RUE and gradually worsened over the past 6 months where pain lingers after activities.    Pt received a steroid injection on 3/20/25, which exacerbated her pain.          Recurrent probem    Quality of life: excellent    Patient Goals  Patient goals for therapy: decreased pain, increased strength, independence with ADLs/IADLs and increased motion  Patient goal: use R hand for crocheting  Pain  Current pain rating: 3  At best pain ratin  At worst pain ratin  Location: radial wrist, radiating distally into thumb  Quality: dull ache, radiating and throbbing (ocassional numbness, \"toothache\")  Relieving factors: support and heat (wearing thumb spica)  Aggravating factors: keyboarding (driving, writing, opening pill bottles)  Progression: improved    Social Support  Lives with: spouse    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     Additional Palpation Details  (+) EPB, APL    Neurological Testing     Additional Neurological Details  Pt denies any numbness/tingling    Active Range of Motion     Right Wrist   Normal active range of motion    Right Thumb   Kapandji score: 10 degrees    Additional Active Range of Motion Details  (+) pain at end range of wrist flexion, RD/UD  Digital AROM is WNL    Strength/Myotome Testing     Left Wrist/Hand      (2nd hand position)     Trial 1: 53.5    Trial 2: 52.4    Trial 3: 45.1    Average: 50.33    Thumb " "Strength  Key/Lateral Pinch     Trial 1: 10.3    Trial 2: 9.6    Trial 3: 9.4    Average: 9.77  Tip/Two-Point Pinch     Trial 1: 7.8    Trial 2: 11    Trial 3: 8    Average: 8.93  Palmar/Three-Point Pinch     Trial 1: 8.4    Trial 2: 8.8    Trial 3: 8.3    Average: 8.5     Right Wrist/Hand   Wrist extension: 4+  Wrist flexion: 5  Radial deviation: 4+  Ulnar deviation: 5     (2nd hand position)     Trial 1: 48.7    Thumb Strength   Key/Lateral Pinch     Trial 1: 8.6  Tip/Two-Point Pinch     Trial 1: 6.2  Palmar/Three-Point Pinch     Trial 1: 7.1    Additional Strength Details  R thumb MMT:  Flexion = 5/5  Extension = 4/5  P. Abduction = 4+/5  Adduction = 5/5    Tests     Right Wrist/Hand   Positive CMC grind and Finkelstein's.   Negative CMC shoulder sign.             Daily Note/Discharge Note    Today's date: 2025  Patient name: Carolina Perez  : 1975  MRN: [id number]  Referring provider: Bandar Appiah MD  Dx:   Encounter Diagnosis     ICD-10-CM    1. De Quervain's tenosynovitis, right  M65.4       2. Primary osteoarthritis of first carpometacarpal joint of right hand  M18.11           Start Time: 1115  Stop Time: 1200  Total time in clinic (min): 45 minutes    Subjective: \"I'm going back to the Dr to discuss possible surgery.\"      Objective: See treatment diary below      Assessment: Tolerated treatment well. Patient is requesting discharge from OT at this time. She will follow up with hand surgeon to discuss further medical intervention. Pt demo good understanding of HEP and orthotic use.      Plan: D/C from OT     Precautions: Universal    POC expires Unit limit Auth Expiration date PT/OT/ST + Visit Limit?   7/3 BOMN 10/7/25 30 PCY OT/PT               MD:             Visit/Unit Tracking  AUTH Status:  Date            Visits auth'd = 9 Used 1 2 3 4            Remaining  8 7 6 5               HEP: Access Code: [email or web address]     Manuals 4/11 5/2 5/16 - hold Tx today   " "  IASTM 5' 8'  10' 1st DC, ECRL/ECRB 10' 1st DC, ECRL/ECRB    Joint mobs  2' CMC  Thumb MP, CMC joint distraction 5' Thumb MP, CMC joint distraction 5'    Self mobs (butterfly/chest mob)    KT 1st DC 3'   1st DC 3'     TP compression    1st web space 30\" 4x 1st web space 30\" 3x, chip clip 30\" 1x    splinting     Demo use of comfort cool during the day; thumb spica PM    Neuro Re-Ed         Activity Mod Larger  pen, avoid static hold (phone), avoid pinch combined w/WF & RD/UD    reviewed                                                          Ther Ex  30'  15' 10'    Pt educ/HEP: Finkelstein's stretch, opp slides, thumb dneeen, ecc RD Eccentric thumb & wrist ext   Reviewed    PROM 1:1  Wrist, thumb 5'  Wrist, thumb 5' Wrist, thumb 5'    Extrinsic stretches Finkelstein stretch 5\" 5x P: WF, UD 5\" 10x  A: 1st DC stretch 5\" 5x  P: WF, UD 5\" 10x  A: ext mm, 1st DC stretch 5\" 10x ea P: WF, UD 5\" 10x  A: ext mm, 1st DC stretch 5\" 10x ea    Ecc RD 1# 5x 1# 2x10  hold     Thumb deneen 5\" 3x all planes 5\" 10x all planes  5\" 10x all planes, <50%     Ecc WE  1# 2x10  hold     Ecc thumb ext  RB on TB 3x10  hold     AROM - thumb    Opposition, opp slides 10x Opposition, opp slides 10x             Ther Activity         Translation/ dexterity  Key pegs 1x  hold                                         Modalities         MHP  5' pre  5' pre 5' pre                    "

## 2025-07-02 ENCOUNTER — OFFICE VISIT (OUTPATIENT)
Dept: OBGYN CLINIC | Facility: CLINIC | Age: 50
End: 2025-07-02
Payer: COMMERCIAL

## 2025-07-02 VITALS — BODY MASS INDEX: 38.28 KG/M2 | HEIGHT: 62 IN | WEIGHT: 208 LBS

## 2025-07-02 DIAGNOSIS — M65.4 DE QUERVAIN'S TENOSYNOVITIS, RIGHT: Primary | ICD-10-CM

## 2025-07-02 DIAGNOSIS — Z01.812 PRE-PROCEDURE LAB EXAM: ICD-10-CM

## 2025-07-02 PROCEDURE — 99214 OFFICE O/P EST MOD 30 MIN: CPT | Performed by: SURGERY

## 2025-07-02 RX ORDER — CHLORHEXIDINE GLUCONATE ORAL RINSE 1.2 MG/ML
15 SOLUTION DENTAL ONCE
OUTPATIENT
Start: 2025-07-02 | End: 2025-07-02

## 2025-07-02 RX ORDER — SODIUM CHLORIDE, SODIUM LACTATE, POTASSIUM CHLORIDE, CALCIUM CHLORIDE 600; 310; 30; 20 MG/100ML; MG/100ML; MG/100ML; MG/100ML
75 INJECTION, SOLUTION INTRAVENOUS CONTINUOUS
OUTPATIENT
Start: 2025-07-02

## 2025-07-02 NOTE — H&P
Bandar Appiah M.D.  Attending, Orthopaedic Surgery  Hand, Wrist, and Elbow Surgery  [facility]      ORTHOPAEDIC HAND, WRIST, AND ELBOW OFFICE  VISIT       ASSESSMENT/PLAN:        Assessment & Plan  De Quervain's tenosynovitis, right  We reviewed continued conservative management as well as surgical intervention. The patient would like to proceed with surgical intervention   The risks, benefits, and recovery were reviewed in clinic today, patient agreeable   All of their questions were answered in detail today.  The patient is agreeable to this plan.                  The patient verbalized understanding of exam findings and treatment plan. We engaged in the shared decision-making process and treatment options were discussed at length with the patient. Surgical and conservative management discussed today along with risks and benefits.      Follow Up:  No follow-ups on file.    To Do Next Visit:  Re-evaluation of current issue      General Discussions:  De Quervain Tenosynovitis: The anatomy and physiology of de Quervain's tenosynovitis was discussed with the patient today in the office.  Edema and increased contact pressure within the first dorsal extensor compartment at the radial styloid can cause pain, crepitation, and limitation of function.  Treatment options include resting thumb spica splints to decrease edema, oral anti-inflammatory medications, home or formal therapy exercises, up to 2 steroid injections within the first dorsal extensor compartment, or surgical release.  While majority of patients do respond to conservative treatment, up to 20% may require surgical release.     Operative Discussions:  De Quervain Release:   The anatomy and physiology of de Quervain's tenosynovitis was discussed with the patient today in the office.  Edema and increased contact pressure within the first dorsal extensor compartment at the radial styloid can cause pain, crepitation, and limitation of  function.  Treatment options include resting thumb spica splints to decrease edema, oral anti-inflammatory medications, home or formal therapy exercises, up to 2 steroid injections within the first dorsal extensor compartment, or surgical release.  While majority of patients do respond to conservative treatment, up to 20% may require surgical release. The patient has elected to undergo a release of the first dorsal extensor compartment (de Quervain's).  A small incision will be made over the radial styloid of the wrist, the tendon sheath holding the abductor pollicis longus and extensor pollicis brevis will be released.  In the postoperative period, light activities are allowed immediately, driving is allowed when narcotic medication has stopped, and the incision may get wet after 2 days.  Heavy activities (lifting more than approximately 10 pounds) will be allowed after the follow up appointment in 1-2 weeks.  While the pain and discomfort within the wrist typically improves rapidly, some residual discomfort may be present for up to 6 weeks.  The patient may notice temporary numbness within the superficial sensory branch of the radial nerve secondary to a traction neuropraxia.  This is often a self-limiting condition.  The patient has an understanding of the above mentioned discussion.  Approximate success rate is 98%.The risks and benefits of the procedure were explained to the patient, which include, but are not limited to: Bleeding, infection, recurrence, pain, scar, damage to tendons, damage to nerves, and damage to blood vessels, failure to give desired results and complications related to anesthesia.  These risks, along with alternative conservative treatment options, and postoperative protocols were voiced back and understood by the patient.  All questions were answered to the patient's satisfaction.  The patient agrees to comply with a standard postoperative protocol, and is willing to proceed.  Education  was provided via written and auditory forms.  There were no barriers to learning. Written handouts regarding wound care, incision and scar care, and general preoperative information was provided to the patient.  Prior to surgery, the patient may be requested to stop all anti-inflammatory medications.  Prophylactic aspirin, Plavix, and Coumadin may be allowed to be continued.  Medications including vitamin E., ginkgo, and fish oil are requested to be stopped approximately one week prior to surgery.  Hypertensive medications and beta blockers, if taken, should be continued.      ____________________________________________________________________________________________________________________________________________      CHIEF COMPLAINT:  Chief Complaint   Patient presents with    Follow-up     No improvements with OT       SUBJECTIVE:  Carolina Perez is a 50 y.o. year old RHD female who presents for follow up evaluation of right De Quervain's tenosynovitis. She was last seen in clinic on 5/15/2025 where she received her second injection. She is continuing to wear her brace at night and attend OT. She reports that she has mild pain in the morning but increased pain with activity.      I have personally reviewed all the relevant PMH, PSH, SH, FH, Medications and allergies      PAST MEDICAL HISTORY:  Past Medical History[1]    PAST SURGICAL HISTORY:  Past Surgical History[2]    FAMILY HISTORY:  Family History[3]    SOCIAL HISTORY:  Social History[4]    MEDICATIONS:  Current Medications[5]    ALLERGIES:  Allergies[6]        REVIEW OF SYSTEMS:  Review of Systems    VITALS:  There were no vitals filed for this visit.    LABS:      _____________________________________________________  PHYSICAL EXAMINATION:  General: well developed and well nourished, alert, oriented times 3, and appears comfortable  Psychiatric: Normal  HEENT: Normocephalic, Atraumatic Trachea Midline, No torticollis  Pulmonary: No audible wheezing or  "respiratory distress   Abdomen/GI: Non tender, non distended   Cardiovascular: No pitting edema, 2+ radial pulse   Skin: No masses, erythema, lacerations, fluctation, ulcerations  Neurovascular: Sensation Intact to the Median, Ulnar, Radial Nerve, Motor Intact to the Median, Ulnar, Radial Nerve, and Pulses Intact  Musculoskeletal: Normal, except as noted in detailed exam and in HPI.      MUSCULOSKELETAL EXAMINATION:    Right thumb     Positive tender to palpation over 1st dorsal extensor compartment   Negative palpable nodule  Negative crepitus over 1st dorsal extensor compartment   Positive Finkelstein's test    Positive pain with resisted abduction of the thumb  Mild tenderness over the CMC      ___________________________________________________  STUDIES REVIEWED:  No new imaging obtained today.     LABS REVIEWED:    HgA1c: No results found for: \"HGBA1C\"  BMP:   Lab Results   Component Value Date    CALCIUM 9.0 10/27/2023    K 4.8 10/27/2023    CO2 28 10/27/2023     10/27/2023    BUN 12 10/27/2023    CREATININE 0.73 10/27/2023               PROCEDURES PERFORMED:  Procedures  No Procedures performed today    _____________________________________________________      Scribe Attestation      I,:  Bell Saeed am acting as a scribe while in the presence of the attending physician.:       I,:  Bandar Appiah MD personally performed the services described in this documentation    as scribed in my presence.:                             [1]   Past Medical History:  Diagnosis Date    Achalasia 2006    Allergic     Anxiety     Depression     Obesity    [2]   Past Surgical History:  Procedure Laterality Date     SECTION      CHOLECYSTECTOMY      GA I&D VULVA/PERINEAL ABSCESS N/A 2022    Procedure: INCISION AND DRAINAGE (I&D) PERINEAL;  Surgeon: Eric Perkins MD;  Location: [facility];  Service: General    GA LAPS ESOPHAGOMYOTOMY W/FUNDOPLASTY IF PERFORMED N/A 2019    Procedure: LAPAROSCOPIC " HELLER MYOTOMY AND PARTIAL FUNDOPLICATION, ALEX;  Surgeon: Seema Jackson MD;  Location: [facility];  Service: Thoracic    UPPER GASTROINTESTINAL ENDOSCOPY     [3]   Family History  Problem Relation Name Age of Onset    Asthma Mother Mom     Hypertension Mother Mom     Diabetes Mother Mom     Hyperlipidemia Mother Mom     Arthritis Mother Mom     Cancer Father Dad     Mental illness Father Dad         PTSD    Depression Father Dad    [4]   Social History  Tobacco Use    Smoking status: Former     Current packs/day: 0.00     Average packs/day: 1 pack/day for 21.6 years (21.6 ttl pk-yrs)     Types: Cigarettes     Start date:      Quit date: 2016     Years since quittin.8    Smokeless tobacco: Never   Vaping Use    Vaping status: Never Used   Substance Use Topics    Alcohol use: Not Currently    Drug use: Never   [5]   Current Outpatient Medications:     glucosamine-chondroitin 500-400 MG tablet, Take 1 tablet by mouth in the morning and 1 tablet in the evening and 1 tablet before bedtime., Disp: , Rfl:     methylPREDNISolone 4 MG tablet therapy pack, Use as directed on package, Disp: 21 tablet, Rfl: 0    Tart Cherry 1200 MG CAPS, Take by mouth, Disp: , Rfl:     traZODone (DESYREL) 100 mg tablet, Take 1 tablet (100 mg total) by mouth daily at bedtime, Disp: 90 tablet, Rfl: 0    Turmeric (QC Tumeric Complex) 500 MG CAPS, Take by mouth (Patient not taking: Reported on 2025), Disp: , Rfl:   [6]   Allergies  Allergen Reactions    Promethazine Other (See Comments)     Restless legs

## 2025-07-02 NOTE — ASSESSMENT & PLAN NOTE
We reviewed continued conservative management as well as surgical intervention. The patient would like to proceed with surgical intervention   The risks, benefits, and recovery were reviewed in clinic today, patient agreeable   All of their questions were answered in detail today.  The patient is agreeable to this plan.

## 2025-07-02 NOTE — PROGRESS NOTES
Bandar Appiah M.D.  Attending, Orthopaedic Surgery  Hand, Wrist, and Elbow Surgery  [facility]      ORTHOPAEDIC HAND, WRIST, AND ELBOW OFFICE  VISIT       ASSESSMENT/PLAN:        Assessment & Plan  De Quervain's tenosynovitis, right  We reviewed continued conservative management as well as surgical intervention. The patient would like to proceed with surgical intervention   The risks, benefits, and recovery were reviewed in clinic today, patient agreeable   All of their questions were answered in detail today.  The patient is agreeable to this plan.                  The patient verbalized understanding of exam findings and treatment plan. We engaged in the shared decision-making process and treatment options were discussed at length with the patient. Surgical and conservative management discussed today along with risks and benefits.      Follow Up:  No follow-ups on file.    To Do Next Visit:  Re-evaluation of current issue      General Discussions:  De Quervain Tenosynovitis: The anatomy and physiology of de Quervain's tenosynovitis was discussed with the patient today in the office.  Edema and increased contact pressure within the first dorsal extensor compartment at the radial styloid can cause pain, crepitation, and limitation of function.  Treatment options include resting thumb spica splints to decrease edema, oral anti-inflammatory medications, home or formal therapy exercises, up to 2 steroid injections within the first dorsal extensor compartment, or surgical release.  While majority of patients do respond to conservative treatment, up to 20% may require surgical release.     Operative Discussions:  De Quervain Release:   The anatomy and physiology of de Quervain's tenosynovitis was discussed with the patient today in the office.  Edema and increased contact pressure within the first dorsal extensor compartment at the radial styloid can cause pain, crepitation, and limitation of  function.  Treatment options include resting thumb spica splints to decrease edema, oral anti-inflammatory medications, home or formal therapy exercises, up to 2 steroid injections within the first dorsal extensor compartment, or surgical release.  While majority of patients do respond to conservative treatment, up to 20% may require surgical release. The patient has elected to undergo a release of the first dorsal extensor compartment (de Quervain's).  A small incision will be made over the radial styloid of the wrist, the tendon sheath holding the abductor pollicis longus and extensor pollicis brevis will be released.  In the postoperative period, light activities are allowed immediately, driving is allowed when narcotic medication has stopped, and the incision may get wet after 2 days.  Heavy activities (lifting more than approximately 10 pounds) will be allowed after the follow up appointment in 1-2 weeks.  While the pain and discomfort within the wrist typically improves rapidly, some residual discomfort may be present for up to 6 weeks.  The patient may notice temporary numbness within the superficial sensory branch of the radial nerve secondary to a traction neuropraxia.  This is often a self-limiting condition.  The patient has an understanding of the above mentioned discussion.  Approximate success rate is 98%.The risks and benefits of the procedure were explained to the patient, which include, but are not limited to: Bleeding, infection, recurrence, pain, scar, damage to tendons, damage to nerves, and damage to blood vessels, failure to give desired results and complications related to anesthesia.  These risks, along with alternative conservative treatment options, and postoperative protocols were voiced back and understood by the patient.  All questions were answered to the patient's satisfaction.  The patient agrees to comply with a standard postoperative protocol, and is willing to proceed.  Education  was provided via written and auditory forms.  There were no barriers to learning. Written handouts regarding wound care, incision and scar care, and general preoperative information was provided to the patient.  Prior to surgery, the patient may be requested to stop all anti-inflammatory medications.  Prophylactic aspirin, Plavix, and Coumadin may be allowed to be continued.  Medications including vitamin E., ginkgo, and fish oil are requested to be stopped approximately one week prior to surgery.  Hypertensive medications and beta blockers, if taken, should be continued.      ____________________________________________________________________________________________________________________________________________      CHIEF COMPLAINT:  Chief Complaint   Patient presents with    Follow-up     No improvements with OT       SUBJECTIVE:  Carolina Perez is a 50 y.o. year old RHD female who presents for follow up evaluation of right De Quervain's tenosynovitis. She was last seen in clinic on 5/15/2025 where she received her second injection. She is continuing to wear her brace at night and attend OT. She reports that she has mild pain in the morning but increased pain with activity.      I have personally reviewed all the relevant PMH, PSH, SH, FH, Medications and allergies      PAST MEDICAL HISTORY:  Past Medical History[1]    PAST SURGICAL HISTORY:  Past Surgical History[2]    FAMILY HISTORY:  Family History[3]    SOCIAL HISTORY:  Social History[4]    MEDICATIONS:  Current Medications[5]    ALLERGIES:  Allergies[6]        REVIEW OF SYSTEMS:  Review of Systems    VITALS:  There were no vitals filed for this visit.    LABS:      _____________________________________________________  PHYSICAL EXAMINATION:  General: well developed and well nourished, alert, oriented times 3, and appears comfortable  Psychiatric: Normal  HEENT: Normocephalic, Atraumatic Trachea Midline, No torticollis  Pulmonary: No audible wheezing or  "respiratory distress   Abdomen/GI: Non tender, non distended   Cardiovascular: No pitting edema, 2+ radial pulse   Skin: No masses, erythema, lacerations, fluctation, ulcerations  Neurovascular: Sensation Intact to the Median, Ulnar, Radial Nerve, Motor Intact to the Median, Ulnar, Radial Nerve, and Pulses Intact  Musculoskeletal: Normal, except as noted in detailed exam and in HPI.      MUSCULOSKELETAL EXAMINATION:    Right thumb     Positive tender to palpation over 1st dorsal extensor compartment   Negative palpable nodule  Negative crepitus over 1st dorsal extensor compartment   Positive Finkelstein's test    Positive pain with resisted abduction of the thumb  Mild tenderness over the CMC      ___________________________________________________  STUDIES REVIEWED:  No new imaging obtained today.     LABS REVIEWED:    HgA1c: No results found for: \"HGBA1C\"  BMP:   Lab Results   Component Value Date    CALCIUM 9.0 10/27/2023    K 4.8 10/27/2023    CO2 28 10/27/2023     10/27/2023    BUN 12 10/27/2023    CREATININE 0.73 10/27/2023               PROCEDURES PERFORMED:  Procedures  No Procedures performed today    _____________________________________________________      Scribe Attestation      I,:  Bell Saeed am acting as a scribe while in the presence of the attending physician.:       I,:  Bandar Appiah MD personally performed the services described in this documentation    as scribed in my presence.:                           [1]   Past Medical History:  Diagnosis Date    Achalasia 2006    Allergic     Anxiety     Depression     Obesity    [2]   Past Surgical History:  Procedure Laterality Date     SECTION      CHOLECYSTECTOMY      AL I&D VULVA/PERINEAL ABSCESS N/A 2022    Procedure: INCISION AND DRAINAGE (I&D) PERINEAL;  Surgeon: Eric Perkins MD;  Location: [facility];  Service: General    AL LAPS ESOPHAGOMYOTOMY W/FUNDOPLASTY IF PERFORMED N/A 2019    Procedure: LAPAROSCOPIC " HELLER MYOTOMY AND PARTIAL FUNDOPLICATION, ALEX;  Surgeon: Seema Jackson MD;  Location: [facility];  Service: Thoracic    UPPER GASTROINTESTINAL ENDOSCOPY     [3]   Family History  Problem Relation Name Age of Onset    Asthma Mother Mom     Hypertension Mother Mom     Diabetes Mother Mom     Hyperlipidemia Mother Mom     Arthritis Mother Mom     Cancer Father Dad     Mental illness Father Dad         PTSD    Depression Father Dad    [4]   Social History  Tobacco Use    Smoking status: Former     Current packs/day: 0.00     Average packs/day: 1 pack/day for 21.6 years (21.6 ttl pk-yrs)     Types: Cigarettes     Start date:      Quit date: 2016     Years since quittin.8    Smokeless tobacco: Never   Vaping Use    Vaping status: Never Used   Substance Use Topics    Alcohol use: Not Currently    Drug use: Never   [5]   Current Outpatient Medications:     glucosamine-chondroitin 500-400 MG tablet, Take 1 tablet by mouth in the morning and 1 tablet in the evening and 1 tablet before bedtime., Disp: , Rfl:     methylPREDNISolone 4 MG tablet therapy pack, Use as directed on package, Disp: 21 tablet, Rfl: 0    Tart Cherry 1200 MG CAPS, Take by mouth, Disp: , Rfl:     traZODone (DESYREL) 100 mg tablet, Take 1 tablet (100 mg total) by mouth daily at bedtime, Disp: 90 tablet, Rfl: 0    Turmeric (QC Tumeric Complex) 500 MG CAPS, Take by mouth (Patient not taking: Reported on 2025), Disp: , Rfl:   [6]   Allergies  Allergen Reactions    Promethazine Other (See Comments)     Restless legs

## (undated) DEVICE — 3000CC GUARDIAN II: Brand: GUARDIAN

## (undated) DEVICE — PACK PBDS LAP CHOLE RF

## (undated) DEVICE — INSUFLATION TUBING INSUFLOW (LEXION)

## (undated) DEVICE — GLOVE SRG BIOGEL ECLIPSE 7.5

## (undated) DEVICE — HARMONIC ACE +7 LAPAROSCOPIC SHEARS ADVANCED HEMOSTASIS 5MM DIAMETER 36CM SHAFT LENGTH  FOR USE WITH GRAY HAND PIECE ONLY: Brand: HARMONIC ACE

## (undated) DEVICE — DRAPE TOWEL: Brand: CONVERTORS

## (undated) DEVICE — GLOVE INDICATOR PI UNDERGLOVE SZ 6.5 BLUE

## (undated) DEVICE — ENDOPATH 5MM CURVED SCISSORS WITH MONOPOLAR CAUTERY: Brand: ENDOPATH

## (undated) DEVICE — ELECTRODE LAP L WIRE E-Z CLEAN 33CM -0100

## (undated) DEVICE — KERLIX BANDAGE ROLL: Brand: KERLIX

## (undated) DEVICE — PENROSE DRAIN, 18 X 3 8: Brand: CARDINAL HEALTH

## (undated) DEVICE — POOLE SUCTION HANDLE: Brand: CARDINAL HEALTH

## (undated) DEVICE — HEAVY DRAINAGE PACK: Brand: CURITY

## (undated) DEVICE — TK® QUICK LOAD® UNIT: Brand: TK® QUICK LOAD®

## (undated) DEVICE — TK® TI-KNOT® DEVICE: Brand: TK® TI-KNOT®

## (undated) DEVICE — PENCIL ELECTROSURG E-Z CLEAN -0035H

## (undated) DEVICE — SUT VICRYL 0 UR-6 27 IN J603H

## (undated) DEVICE — TUBING SUCTION 5MM X 12 FT

## (undated) DEVICE — ADHESIVE SKN CLSR HISTOACRYL FLEX 0.5ML LF

## (undated) DEVICE — BETHLEHEM UNIVERSAL MINOR GEN: Brand: CARDINAL HEALTH

## (undated) DEVICE — GLOVE INDICATOR PI UNDERGLOVE SZ 7.5 BLUE

## (undated) DEVICE — INTENDED FOR TISSUE SEPARATION, AND OTHER PROCEDURES THAT REQUIRE A SHARP SURGICAL BLADE TO PUNCTURE OR CUT.: Brand: BARD-PARKER SAFETY BLADES SIZE 11, STERILE

## (undated) DEVICE — INTENDED FOR TISSUE SEPARATION, AND OTHER PROCEDURES THAT REQUIRE A SHARP SURGICAL BLADE TO PUNCTURE OR CUT.: Brand: BARD-PARKER SAFETY BLADES SIZE 15, STERILE

## (undated) DEVICE — ENDO STITCH DEVICE 10 MM

## (undated) DEVICE — AIR AND WATER TUBING/CAP SET FOR OLYMPUS® SCOPES: Brand: ERBE

## (undated) DEVICE — 2000CC GUARDIAN II: Brand: GUARDIAN

## (undated) DEVICE — NEEDLE 25G X 1 1/2

## (undated) DEVICE — ENDO STITCH 2-0 SURGIDAC 48 IN

## (undated) DEVICE — TROCAR: Brand: KII FIOS FIRST ENTRY

## (undated) DEVICE — SURGICEL 4 X 8

## (undated) DEVICE — TROCAR: Brand: KII® SLEEVE

## (undated) DEVICE — Device: Brand: DEFENDO AIR/WATER/SUCTION AND BIOPSY VALVE

## (undated) DEVICE — TROCARS: Brand: KII® BALLOON BLUNT TIP SYSTEM

## (undated) DEVICE — PAD GROUNDING ADULT

## (undated) DEVICE — GLOVE SRG BIOGEL ECLIPSE 6

## (undated) DEVICE — TRAY FOLEY 16FR URIMETER SURESTEP

## (undated) DEVICE — 5 MM CURVED DISSECTORS WITH MONOPOLAR CAUTERY: Brand: ENDOPATH

## (undated) DEVICE — SUT MONOCRYL 4-0 PS-2 18 IN Y496G

## (undated) DEVICE — INVIEW CLEAR LEGGINGS: Brand: CONVERTORS

## (undated) DEVICE — ELECTRODE BLADE MOD E-Z CLEAN 2.5IN 6.4CM -0012M

## (undated) DEVICE — CHLORAPREP HI-LITE 26ML ORANGE

## (undated) DEVICE — DRAPE FLUID WARMER (BIRD BATH)

## (undated) DEVICE — PDS II VLT 0 107CM AG ST3: Brand: ENDOLOOP